# Patient Record
Sex: MALE | Race: WHITE | NOT HISPANIC OR LATINO | Employment: FULL TIME | URBAN - METROPOLITAN AREA
[De-identification: names, ages, dates, MRNs, and addresses within clinical notes are randomized per-mention and may not be internally consistent; named-entity substitution may affect disease eponyms.]

---

## 2023-06-14 PROBLEM — K42.9 UMBILICAL HERNIA: Status: ACTIVE | Noted: 2023-06-14

## 2023-06-14 PROBLEM — M47.812 DEGENERATIVE ARTHRITIS OF CERVICAL SPINE: Status: ACTIVE | Noted: 2023-06-14

## 2023-06-14 PROBLEM — K40.90 INGUINAL HERNIA, RIGHT: Status: ACTIVE | Noted: 2023-06-14

## 2023-06-14 PROBLEM — I10 ESSENTIAL HYPERTENSION: Status: ACTIVE | Noted: 2023-06-14

## 2023-06-14 PROBLEM — F32.A DEPRESSION: Status: ACTIVE | Noted: 2023-06-14

## 2023-06-14 PROBLEM — R73.01 IMPAIRED FASTING GLUCOSE: Status: ACTIVE | Noted: 2023-06-14

## 2023-06-14 PROBLEM — S68.110A AMPUTATION OF RIGHT INDEX FINGER: Status: ACTIVE | Noted: 2023-06-14

## 2023-06-14 PROBLEM — M77.10 LATERAL EPICONDYLITIS: Status: ACTIVE | Noted: 2023-06-14

## 2023-06-14 PROBLEM — K13.0 LIP ULCER: Status: ACTIVE | Noted: 2023-06-14

## 2023-06-14 PROBLEM — M54.12 CERVICAL RADICULITIS: Status: ACTIVE | Noted: 2023-06-14

## 2023-06-14 PROBLEM — M72.0 DUPUYTREN'S DISEASE OF PALM: Status: ACTIVE | Noted: 2023-06-14

## 2023-06-14 PROBLEM — E78.5 DYSLIPIDEMIA: Status: ACTIVE | Noted: 2023-06-14

## 2023-06-14 PROBLEM — G62.9 NEUROPATHY: Status: ACTIVE | Noted: 2023-06-14

## 2023-06-14 PROBLEM — E55.9 VITAMIN D DEFICIENCY: Status: ACTIVE | Noted: 2023-06-14

## 2023-06-14 RX ORDER — BUPROPION HYDROCHLORIDE 150 MG/1
1 TABLET ORAL DAILY
COMMUNITY
Start: 2019-04-30 | End: 2023-10-06

## 2023-06-14 RX ORDER — CHOLECALCIFEROL (VITAMIN D3) 125 MCG
1 CAPSULE ORAL DAILY
COMMUNITY
Start: 2022-08-30 | End: 2023-12-19 | Stop reason: WASHOUT

## 2023-06-14 RX ORDER — LISINOPRIL 10 MG/1
1 TABLET ORAL DAILY
COMMUNITY
Start: 2014-08-26 | End: 2023-10-06

## 2023-06-15 ENCOUNTER — OFFICE VISIT (OUTPATIENT)
Dept: PRIMARY CARE | Facility: CLINIC | Age: 58
End: 2023-06-15
Payer: COMMERCIAL

## 2023-06-15 ENCOUNTER — APPOINTMENT (OUTPATIENT)
Dept: PRIMARY CARE | Facility: CLINIC | Age: 58
End: 2023-06-15
Payer: COMMERCIAL

## 2023-06-15 VITALS
BODY MASS INDEX: 26.19 KG/M2 | DIASTOLIC BLOOD PRESSURE: 76 MMHG | TEMPERATURE: 97.2 F | SYSTOLIC BLOOD PRESSURE: 124 MMHG | HEIGHT: 73 IN | WEIGHT: 197.6 LBS | HEART RATE: 60 BPM

## 2023-06-15 DIAGNOSIS — M54.10 ACUTE LOW BACK PAIN WITH RADICULAR SYMPTOMS, DURATION LESS THAN 6 WEEKS: Primary | ICD-10-CM

## 2023-06-15 PROCEDURE — 3074F SYST BP LT 130 MM HG: CPT | Performed by: NURSE PRACTITIONER

## 2023-06-15 PROCEDURE — 3078F DIAST BP <80 MM HG: CPT | Performed by: NURSE PRACTITIONER

## 2023-06-15 PROCEDURE — 99214 OFFICE O/P EST MOD 30 MIN: CPT | Performed by: NURSE PRACTITIONER

## 2023-06-15 RX ORDER — CYCLOBENZAPRINE HCL 10 MG
10 TABLET ORAL 2 TIMES DAILY PRN
Qty: 30 TABLET | Refills: 0 | Status: SHIPPED | OUTPATIENT
Start: 2023-06-15 | End: 2023-12-26 | Stop reason: WASHOUT

## 2023-06-15 RX ORDER — METHYLPREDNISOLONE 4 MG/1
TABLET ORAL
Qty: 21 TABLET | Refills: 0 | Status: SHIPPED | OUTPATIENT
Start: 2023-06-15 | End: 2023-06-22

## 2023-06-15 NOTE — PATIENT INSTRUCTIONS
Good to see you today  Xray of back and I will follow up   Please set up Red Crow[facility] for additional communication  Medrol Dose Pack as Instructed TAKE WITH FOOD  STOP IBUPROFEN WHEN YOU START THIS  Flexeril (Cyclobenzaprine) 1/2-1 up to twice daily DO NOT DRIVE FOR 6 hours  Try heat/ice and stretch  Follow up will depend on x-ray result

## 2023-06-15 NOTE — PROGRESS NOTES
"Subjective   Patient ID: Joaquim Batres is a 57 y.o. male who presents for Back Pain (Started about 3 weeks ago).    HPI   Drives a lot in a truck.    About 3 weeks ago started to have some numbness and tightness  and pain into back of legs and both legs equally.  Denies any bowel or bladder changes.  Sleeping OK   Golf and yard work.  No hx of trauma to back  Steps  - a little unsteady going down steps  \"Bending over is bad\"  800mg Ibuprofen in AM and then again later in the day.    Hx degenerative change in neck but has not had imaging of low back.    Review of Systems   All other systems reviewed and are negative.      Objective   BP 98/62   Pulse 60   Temp 36.2 °C (97.2 °F)   Ht 1.854 m (6' 1\")   Wt 89.6 kg (197 lb 9.6 oz)   BMI 26.07 kg/m²     Physical Exam  Vitals and nursing note reviewed.   Constitutional:       Appearance: Normal appearance.   HENT:      Head: Normocephalic and atraumatic.   Cardiovascular:      Rate and Rhythm: Normal rate and regular rhythm.      Pulses: Normal pulses.      Heart sounds: Normal heart sounds.   Pulmonary:      Effort: Pulmonary effort is normal.      Breath sounds: Normal breath sounds.   Musculoskeletal:         General: Tenderness present.      Cervical back: Normal range of motion and neck supple.      Comments: Antalgic gait noted.   Pain at lumbar paraspinal Muscles.    Poor flexibility  Reflexes 2+ TASHA  at [place]  NEG straight leg raise TASHA     Skin:     General: Skin is warm.      Findings: No lesion.   Neurological:      Mental Status: He is alert and oriented to person, place, and time. Mental status is at baseline.      Sensory: Sensory deficit present.   Psychiatric:         Mood and Affect: Mood normal.         Behavior: Behavior normal.         Thought Content: Thought content normal.         Judgment: Judgment normal.             Assessment/Plan   Problem List Items Addressed This Visit    None  Visit Diagnoses       Acute low back pain with " radicular symptoms, duration less than 6 weeks    -  Primary    Relevant Medications    cyclobenzaprine (Flexeril) 10 mg tablet    Other Relevant Orders    XR lumbar spine complete 4+ views (Completed)

## 2023-06-16 NOTE — RESULT ENCOUNTER NOTE
Please let pt know that the x-ray shows some degenerative changes   How is he doing with the medication?

## 2023-06-17 ENCOUNTER — TELEPHONE (OUTPATIENT)
Dept: PRIMARY CARE | Facility: CLINIC | Age: 58
End: 2023-06-17
Payer: COMMERCIAL

## 2023-06-17 NOTE — TELEPHONE ENCOUNTER
Pt is aware of results. He verbalized understanding and had no further questions or concerns at this time.

## 2023-06-17 NOTE — TELEPHONE ENCOUNTER
----- Message from SAUNDRA Garsia sent at 6/16/2023  6:35 PM EDT -----  Please let pt know that the x-ray shows some degenerative changes   How is he doing with the medication?

## 2023-09-15 ENCOUNTER — TELEPHONE (OUTPATIENT)
Dept: PRIMARY CARE | Facility: CLINIC | Age: 58
End: 2023-09-15
Payer: COMMERCIAL

## 2023-09-15 DIAGNOSIS — M54.50 LOW BACK PAIN, UNSPECIFIED BACK PAIN LATERALITY, UNSPECIFIED CHRONICITY, UNSPECIFIED WHETHER SCIATICA PRESENT: Primary | ICD-10-CM

## 2023-09-15 RX ORDER — METHYLPREDNISOLONE 4 MG/1
TABLET ORAL
Qty: 21 TABLET | Refills: 0 | Status: SHIPPED | OUTPATIENT
Start: 2023-09-15 | End: 2023-09-22

## 2023-09-15 NOTE — TELEPHONE ENCOUNTER
Patient called requesting another round of steroids for his back. States he was given a round a few months ago. Please advise    [facility]  LOV: 06/15/2023

## 2023-10-06 DIAGNOSIS — F32.9 MAJOR DEPRESSIVE DISORDER, SINGLE EPISODE, UNSPECIFIED: ICD-10-CM

## 2023-10-06 DIAGNOSIS — I10 PRIMARY HYPERTENSION: Primary | ICD-10-CM

## 2023-10-06 RX ORDER — LISINOPRIL 10 MG/1
10 TABLET ORAL DAILY
Qty: 84 TABLET | Refills: 0 | Status: SHIPPED | OUTPATIENT
Start: 2023-10-06 | End: 2023-12-19 | Stop reason: SDUPTHER

## 2023-10-06 RX ORDER — BUPROPION HYDROCHLORIDE 150 MG/1
150 TABLET ORAL DAILY
Qty: 90 TABLET | Refills: 0 | Status: SHIPPED | OUTPATIENT
Start: 2023-10-06 | End: 2023-12-19 | Stop reason: SDUPTHER

## 2023-12-16 ENCOUNTER — LAB (OUTPATIENT)
Dept: LAB | Facility: LAB | Age: 58
End: 2023-12-16
Payer: COMMERCIAL

## 2023-12-18 DIAGNOSIS — R35.1 NOCTURIA: ICD-10-CM

## 2023-12-18 DIAGNOSIS — I10 PRIMARY HYPERTENSION: Primary | ICD-10-CM

## 2023-12-18 DIAGNOSIS — E78.2 MIXED HYPERLIPIDEMIA: ICD-10-CM

## 2023-12-18 DIAGNOSIS — R73.09 ELEVATED GLUCOSE: ICD-10-CM

## 2023-12-19 ENCOUNTER — OFFICE VISIT (OUTPATIENT)
Dept: PRIMARY CARE | Facility: CLINIC | Age: 58
End: 2023-12-19
Payer: COMMERCIAL

## 2023-12-19 ENCOUNTER — LAB (OUTPATIENT)
Dept: LAB | Facility: LAB | Age: 58
End: 2023-12-19
Payer: COMMERCIAL

## 2023-12-19 VITALS
BODY MASS INDEX: 26.41 KG/M2 | WEIGHT: 195 LBS | SYSTOLIC BLOOD PRESSURE: 120 MMHG | HEIGHT: 72 IN | DIASTOLIC BLOOD PRESSURE: 82 MMHG | TEMPERATURE: 98.2 F

## 2023-12-19 DIAGNOSIS — M54.42 CHRONIC BILATERAL LOW BACK PAIN WITH BILATERAL SCIATICA: ICD-10-CM

## 2023-12-19 DIAGNOSIS — E78.2 MIXED HYPERLIPIDEMIA: ICD-10-CM

## 2023-12-19 DIAGNOSIS — I10 PRIMARY HYPERTENSION: Primary | ICD-10-CM

## 2023-12-19 DIAGNOSIS — I10 PRIMARY HYPERTENSION: ICD-10-CM

## 2023-12-19 DIAGNOSIS — R73.09 ELEVATED GLUCOSE: ICD-10-CM

## 2023-12-19 DIAGNOSIS — G89.29 CHRONIC BILATERAL LOW BACK PAIN WITH BILATERAL SCIATICA: ICD-10-CM

## 2023-12-19 DIAGNOSIS — F32.9 MAJOR DEPRESSIVE DISORDER, SINGLE EPISODE, UNSPECIFIED: ICD-10-CM

## 2023-12-19 DIAGNOSIS — R35.1 NOCTURIA: ICD-10-CM

## 2023-12-19 DIAGNOSIS — M54.41 CHRONIC BILATERAL LOW BACK PAIN WITH BILATERAL SCIATICA: ICD-10-CM

## 2023-12-19 PROBLEM — D48.5 NEOPLASM OF UNCERTAIN BEHAVIOR OF SKIN: Status: ACTIVE | Noted: 2020-10-23

## 2023-12-19 LAB
ALBUMIN SERPL BCP-MCNC: 4.4 G/DL (ref 3.4–5)
ALP SERPL-CCNC: 65 U/L (ref 33–120)
ALT SERPL W P-5'-P-CCNC: 26 U/L (ref 10–52)
ANION GAP SERPL CALC-SCNC: 13 MMOL/L (ref 10–20)
AST SERPL W P-5'-P-CCNC: 17 U/L (ref 9–39)
BILIRUB SERPL-MCNC: 0.8 MG/DL (ref 0–1.2)
BUN SERPL-MCNC: 19 MG/DL (ref 6–23)
CALCIUM SERPL-MCNC: 9 MG/DL (ref 8.6–10.3)
CHLORIDE SERPL-SCNC: 104 MMOL/L (ref 98–107)
CHOLEST SERPL-MCNC: 218 MG/DL (ref 0–199)
CHOLESTEROL/HDL RATIO: 3.6
CO2 SERPL-SCNC: 23 MMOL/L (ref 21–32)
CREAT SERPL-MCNC: 0.99 MG/DL (ref 0.5–1.3)
ERYTHROCYTE [DISTWIDTH] IN BLOOD BY AUTOMATED COUNT: 11.5 % (ref 11.5–14.5)
EST. AVERAGE GLUCOSE BLD GHB EST-MCNC: 114 MG/DL
GFR SERPL CREATININE-BSD FRML MDRD: 89 ML/MIN/1.73M*2
GLUCOSE SERPL-MCNC: 100 MG/DL (ref 74–99)
HBA1C MFR BLD: 5.6 %
HCT VFR BLD AUTO: 43.8 % (ref 41–52)
HDLC SERPL-MCNC: 61.2 MG/DL
HGB BLD-MCNC: 14.6 G/DL (ref 13.5–17.5)
LDLC SERPL CALC-MCNC: 137 MG/DL
MCH RBC QN AUTO: 30.7 PG (ref 26–34)
MCHC RBC AUTO-ENTMCNC: 33.3 G/DL (ref 32–36)
MCV RBC AUTO: 92 FL (ref 80–100)
NON HDL CHOLESTEROL: 157 MG/DL (ref 0–149)
NRBC BLD-RTO: 0 /100 WBCS (ref 0–0)
PLATELET # BLD AUTO: 231 X10*3/UL (ref 150–450)
POTASSIUM SERPL-SCNC: 4.5 MMOL/L (ref 3.5–5.3)
PROT SERPL-MCNC: 6.7 G/DL (ref 6.4–8.2)
PSA SERPL-MCNC: 0.8 NG/ML
RBC # BLD AUTO: 4.75 X10*6/UL (ref 4.5–5.9)
SODIUM SERPL-SCNC: 135 MMOL/L (ref 136–145)
TRIGL SERPL-MCNC: 99 MG/DL (ref 0–149)
TSH SERPL-ACNC: 2.41 MIU/L (ref 0.44–3.98)
VLDL: 20 MG/DL (ref 0–40)
WBC # BLD AUTO: 4.3 X10*3/UL (ref 4.4–11.3)

## 2023-12-19 PROCEDURE — 3079F DIAST BP 80-89 MM HG: CPT | Performed by: NURSE PRACTITIONER

## 2023-12-19 PROCEDURE — 36415 COLL VENOUS BLD VENIPUNCTURE: CPT

## 2023-12-19 PROCEDURE — 80061 LIPID PANEL: CPT

## 2023-12-19 PROCEDURE — 3074F SYST BP LT 130 MM HG: CPT | Performed by: NURSE PRACTITIONER

## 2023-12-19 PROCEDURE — 83036 HEMOGLOBIN GLYCOSYLATED A1C: CPT

## 2023-12-19 PROCEDURE — 84153 ASSAY OF PSA TOTAL: CPT

## 2023-12-19 PROCEDURE — 99214 OFFICE O/P EST MOD 30 MIN: CPT | Performed by: NURSE PRACTITIONER

## 2023-12-19 PROCEDURE — 84443 ASSAY THYROID STIM HORMONE: CPT

## 2023-12-19 PROCEDURE — 85027 COMPLETE CBC AUTOMATED: CPT

## 2023-12-19 PROCEDURE — 80053 COMPREHEN METABOLIC PANEL: CPT

## 2023-12-19 RX ORDER — BUPROPION HYDROCHLORIDE 150 MG/1
150 TABLET ORAL DAILY
Qty: 90 TABLET | Refills: 3 | Status: SHIPPED | OUTPATIENT
Start: 2023-12-19

## 2023-12-19 RX ORDER — LISINOPRIL 10 MG/1
10 TABLET ORAL DAILY
Qty: 90 TABLET | Refills: 3 | Status: SHIPPED | OUTPATIENT
Start: 2023-12-19

## 2023-12-19 ASSESSMENT — PROMIS GLOBAL HEALTH SCALE
RATE_QUALITY_OF_LIFE: GOOD
CARRYOUT_SOCIAL_ACTIVITIES: GOOD
RATE_AVERAGE_PAIN: 5
RATE_PHYSICAL_HEALTH: FAIR
CARRYOUT_PHYSICAL_ACTIVITIES: MOSTLY
RATE_GENERAL_HEALTH: GOOD
EMOTIONAL_PROBLEMS: RARELY
RATE_MENTAL_HEALTH: GOOD
RATE_AVERAGE_FATIGUE: MILD
RATE_SOCIAL_SATISFACTION: GOOD

## 2023-12-19 ASSESSMENT — PATIENT HEALTH QUESTIONNAIRE - PHQ9
1. LITTLE INTEREST OR PLEASURE IN DOING THINGS: NOT AT ALL
2. FEELING DOWN, DEPRESSED OR HOPELESS: NOT AT ALL
SUM OF ALL RESPONSES TO PHQ9 QUESTIONS 1 AND 2: 0

## 2023-12-19 NOTE — PATIENT INSTRUCTIONS
I will follow up with the labs.  REFER to Pain Management -  [place] [phone]   (Dr. Huang)  Prescriptions refilled   Let me know if you need anything.
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
## 2023-12-20 DIAGNOSIS — E78.2 MIXED HYPERLIPIDEMIA: Primary | ICD-10-CM

## 2023-12-26 PROBLEM — F32.9 MAJOR DEPRESSIVE DISORDER, SINGLE EPISODE, UNSPECIFIED: Status: ACTIVE | Noted: 2023-12-26

## 2023-12-26 PROBLEM — M54.42 CHRONIC BILATERAL LOW BACK PAIN WITH BILATERAL SCIATICA: Status: ACTIVE | Noted: 2023-12-26

## 2023-12-26 PROBLEM — M54.41 CHRONIC BILATERAL LOW BACK PAIN WITH BILATERAL SCIATICA: Status: ACTIVE | Noted: 2023-12-26

## 2023-12-26 PROBLEM — G89.29 CHRONIC BILATERAL LOW BACK PAIN WITH BILATERAL SCIATICA: Status: ACTIVE | Noted: 2023-12-26

## 2023-12-26 ASSESSMENT — ENCOUNTER SYMPTOMS: BACK PAIN: 1

## 2024-01-31 ENCOUNTER — OFFICE VISIT (OUTPATIENT)
Dept: PAIN MEDICINE | Facility: HOSPITAL | Age: 59
End: 2024-01-31
Payer: COMMERCIAL

## 2024-01-31 VITALS
BODY MASS INDEX: 27.28 KG/M2 | HEART RATE: 66 BPM | HEIGHT: 72 IN | RESPIRATION RATE: 16 BRPM | DIASTOLIC BLOOD PRESSURE: 95 MMHG | SYSTOLIC BLOOD PRESSURE: 135 MMHG | WEIGHT: 201.4 LBS

## 2024-01-31 DIAGNOSIS — G89.29 CHRONIC BILATERAL LOW BACK PAIN WITH BILATERAL SCIATICA: ICD-10-CM

## 2024-01-31 DIAGNOSIS — M47.816 LUMBAR SPONDYLOSIS: ICD-10-CM

## 2024-01-31 DIAGNOSIS — M54.16 LUMBAR NEURITIS: Primary | ICD-10-CM

## 2024-01-31 DIAGNOSIS — M54.41 CHRONIC BILATERAL LOW BACK PAIN WITH BILATERAL SCIATICA: ICD-10-CM

## 2024-01-31 DIAGNOSIS — M54.42 CHRONIC BILATERAL LOW BACK PAIN WITH BILATERAL SCIATICA: ICD-10-CM

## 2024-01-31 PROCEDURE — 99204 OFFICE O/P NEW MOD 45 MIN: CPT | Performed by: PHYSICAL MEDICINE & REHABILITATION

## 2024-01-31 PROCEDURE — 3075F SYST BP GE 130 - 139MM HG: CPT | Performed by: PHYSICAL MEDICINE & REHABILITATION

## 2024-01-31 PROCEDURE — 99214 OFFICE O/P EST MOD 30 MIN: CPT | Performed by: PHYSICAL MEDICINE & REHABILITATION

## 2024-01-31 PROCEDURE — 3080F DIAST BP >= 90 MM HG: CPT | Performed by: PHYSICAL MEDICINE & REHABILITATION

## 2024-01-31 RX ORDER — ACETAMINOPHEN AND IBUPROFEN 250; 125 MG/1; MG/1
TABLET, FILM COATED ORAL EVERY 8 HOURS
COMMUNITY

## 2024-01-31 RX ORDER — GABAPENTIN 300 MG/1
CAPSULE ORAL
Qty: 180 CAPSULE | Refills: 2 | Status: SHIPPED | OUTPATIENT
Start: 2024-01-31 | End: 2024-04-22 | Stop reason: SDUPTHER

## 2024-01-31 ASSESSMENT — COLUMBIA-SUICIDE SEVERITY RATING SCALE - C-SSRS
6. HAVE YOU EVER DONE ANYTHING, STARTED TO DO ANYTHING, OR PREPARED TO DO ANYTHING TO END YOUR LIFE?: NO
2. HAVE YOU ACTUALLY HAD ANY THOUGHTS OF KILLING YOURSELF?: NO
1. IN THE PAST MONTH, HAVE YOU WISHED YOU WERE DEAD OR WISHED YOU COULD GO TO SLEEP AND NOT WAKE UP?: NO

## 2024-01-31 ASSESSMENT — PATIENT HEALTH QUESTIONNAIRE - PHQ9
SUM OF ALL RESPONSES TO PHQ9 QUESTIONS 1 AND 2: 0
2. FEELING DOWN, DEPRESSED OR HOPELESS: NOT AT ALL
1. LITTLE INTEREST OR PLEASURE IN DOING THINGS: NOT AT ALL

## 2024-01-31 ASSESSMENT — ENCOUNTER SYMPTOMS
DEPRESSION: 0
LOSS OF SENSATION IN FEET: 0
OCCASIONAL FEELINGS OF UNSTEADINESS: 0
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
## 2024-02-13 ENCOUNTER — EVALUATION (OUTPATIENT)
Dept: PHYSICAL THERAPY | Facility: CLINIC | Age: 59
End: 2024-02-13
Payer: COMMERCIAL

## 2024-02-13 DIAGNOSIS — G89.29 CHRONIC BILATERAL LOW BACK PAIN WITH BILATERAL SCIATICA: Primary | ICD-10-CM

## 2024-02-13 DIAGNOSIS — M54.41 CHRONIC BILATERAL LOW BACK PAIN WITH BILATERAL SCIATICA: Primary | ICD-10-CM

## 2024-02-13 DIAGNOSIS — M47.816 LUMBAR SPONDYLOSIS: ICD-10-CM

## 2024-02-13 DIAGNOSIS — M54.42 CHRONIC BILATERAL LOW BACK PAIN WITH BILATERAL SCIATICA: Primary | ICD-10-CM

## 2024-02-13 DIAGNOSIS — M54.16 LUMBAR NEURITIS: ICD-10-CM

## 2024-02-13 PROCEDURE — 97161 PT EVAL LOW COMPLEX 20 MIN: CPT | Mod: GP

## 2024-02-13 PROCEDURE — 97110 THERAPEUTIC EXERCISES: CPT | Mod: GP

## 2024-02-13 ASSESSMENT — PAIN - FUNCTIONAL ASSESSMENT: PAIN_FUNCTIONAL_ASSESSMENT: 0-10

## 2024-02-13 ASSESSMENT — ENCOUNTER SYMPTOMS
DEPRESSION: 0
LOSS OF SENSATION IN FEET: 1
OCCASIONAL FEELINGS OF UNSTEADINESS: 1

## 2024-02-13 ASSESSMENT — PAIN SCALES - GENERAL: PAINLEVEL_OUTOF10: 2

## 2024-02-13 NOTE — PROGRESS NOTES
Physical Therapy    Physical Therapy Evaluation and Treatment      Patient Name: Joaquim Batres  MRN: [id number]  Today's Date: 2/13/2024  Time Calculation  Start Time: 1745  Stop Time: 1825  Time Calculation (min): 40 min    Assessment:  PT Assessment  PT Assessment Results: Decreased mobility, Pain  Rehab Prognosis: Good The patient is a 58 year old male presenting to PT with LBP, TASHA HS muscle tightness, and painful trunk extension ROM.  The patient demonstrates trunk flexion direction of preference with repetitive trunk movements.  The patient will benefit from skilled intervention to address above deficits & return to previous activity level.      Plan:  OP PT Plan  Treatment/Interventions: Cryotherapy, Hot pack, Therapeutic exercises, Dry needling, Education/ Instruction, Electrical stimulation, Manual therapy, Mechanical traction  PT Plan: Skilled PT  PT Frequency: 1 time per week  Rehab Potential: Good  Plan of Care Agreement: Patient    Current Problem:   1. Chronic bilateral low back pain with bilateral sciatica  Follow Up In Physical Therapy      2. Lumbar neuritis  Referral to Physical Therapy    CANCELED: Follow Up In Physical Therapy      3. Lumbar spondylosis  Referral to Physical Therapy        Subjective    General:  General  Reason for Referral: lumbar neuritis; lumbar spondylosis  Referred By: Sal SUE  Past Medical History Relevant to Rehab: reviewed  The patient reports he has been experiencing LBP with intermittent TASHA LE radicular symptoms for the past 8 months.  No GE.  Pain ranges from 4-8/10.  X-rays 6/15/23 revealed mild degenerative endplate hypertrophy.  Follow with MD in a few months.  The patient's goal is to decrease LBP/LE radicular symptoms with all activities.      Precautions:  Precautions  STEADI Fall Risk Score (The score of 4 or more indicates an increased risk of falling): 5  Precautions Comment: none     Pain:  Pain Assessment  Pain Assessment: 0-10  Pain Score: 2  Pain  "Location: Back  Pain Orientation: Lower     Prior Level of Function:  Prior Function Per Pt/Caregiver Report  Level of [place]: Independent with ADLs and functional transfers    Objective   Trunk AROM (degrees)  Flexion - 80  Extension - 11    Repetitive trunk movements  X 10 Flexion - no change in pain  X 10 Extension - increased LBP     Trunk strength - 5/5    HS flexibility (degrees)  R - 48  L - 45    Outcome Measures:  Other Measures  Oswestry Disablity Index (ENA): 27     Treatments:  Supine piriformis stretch - x3 ea 30\"H  Supine LTRs - x10 ea 5\"H  Seated HS stretch - x3 ea 30\"H    EDUCATION:   HEP    Goals: (by week 8)  Decrease LBP/LE radicular symptoms with all activities <2/10    Increase TASHA HS flexibility to >60 degrees for improving mobility with daily activities    The patient will demonstrate pain free trunk AROM               "

## 2024-02-20 ENCOUNTER — TREATMENT (OUTPATIENT)
Dept: PHYSICAL THERAPY | Facility: CLINIC | Age: 59
End: 2024-02-20
Payer: COMMERCIAL

## 2024-02-20 DIAGNOSIS — M54.42 CHRONIC BILATERAL LOW BACK PAIN WITH BILATERAL SCIATICA: ICD-10-CM

## 2024-02-20 DIAGNOSIS — G89.29 CHRONIC BILATERAL LOW BACK PAIN WITH BILATERAL SCIATICA: ICD-10-CM

## 2024-02-20 DIAGNOSIS — M54.41 CHRONIC BILATERAL LOW BACK PAIN WITH BILATERAL SCIATICA: ICD-10-CM

## 2024-02-20 PROCEDURE — 97110 THERAPEUTIC EXERCISES: CPT | Mod: GP

## 2024-02-20 ASSESSMENT — PAIN - FUNCTIONAL ASSESSMENT: PAIN_FUNCTIONAL_ASSESSMENT: 0-10

## 2024-02-20 ASSESSMENT — PAIN SCALES - GENERAL: PAINLEVEL_OUTOF10: 3

## 2024-02-20 NOTE — PROGRESS NOTES
"Physical Therapy    Physical Therapy Treatment      Patient Name: Joaquim Batres  MRN: [id number]  Today's Date: 2/20/2024  Time Calculation  Start Time: 1700  Stop Time: 1745  Time Calculation (min): 45 min    Assessment:    The patient reports experiencing increased low back discomfort with L to R horizontal oblique pulls.      Plan:   Treatment/Interventions: Cryotherapy, Hot pack, Therapeutic exercises, Dry needling, Education/ Instruction, Electrical stimulation, Manual therapy, Mechanical traction     Current Problem:   1. Chronic bilateral low back pain with bilateral sciatica  Follow Up In Physical Therapy        Subjective    General:     The patient reports he has been compliant with HEP 2x/day.      Precautions:  Precautions  Precautions Comment: none     Pain:  Pain Assessment  Pain Assessment: 0-10  Pain Score: 3  Pain Location: Back  Pain Orientation: Lower        Objective   HS flexibility (degrees)  R - 48  L - 45      Treatments:  EXERCISES Date 2/20/24 Date Date Date    REPS REPS REPS REPS   Back Extension 70# 3x20             Ab Curls 70# 3x15             4 Way Hip Flexion/Abd 60#/50# 3x10             Bank Walks              Tband   Lat Pulls Black x20      Tband   Skis Black x20      Tband   Mid Row Black x20      Tband   Mid traps Black x20             Band Walks       Oblique Pulls <> 20# x10 ea 5\"H       High to low cross body 20# x10 R>L 5\"H; 15# x10 5\"H L>R      Low to high cross body  20# x10 R>L 5\"H; 15# x10 5\"H L>R                                                                                            Goals: (by week 8)  Decrease LBP/LE radicular symptoms with all activities <2/10 -progressing    Increase TASHA HS flexibility to >60 degrees for improving mobility with daily activities    The patient will demonstrate pain free trunk AROM             "

## 2024-02-27 ENCOUNTER — TREATMENT (OUTPATIENT)
Dept: PHYSICAL THERAPY | Facility: CLINIC | Age: 59
End: 2024-02-27
Payer: COMMERCIAL

## 2024-02-27 DIAGNOSIS — M54.41 CHRONIC BILATERAL LOW BACK PAIN WITH BILATERAL SCIATICA: Primary | ICD-10-CM

## 2024-02-27 DIAGNOSIS — M47.816 LUMBAR SPONDYLOSIS: ICD-10-CM

## 2024-02-27 DIAGNOSIS — M54.16 LUMBAR NEURITIS: ICD-10-CM

## 2024-02-27 DIAGNOSIS — M54.42 CHRONIC BILATERAL LOW BACK PAIN WITH BILATERAL SCIATICA: Primary | ICD-10-CM

## 2024-02-27 DIAGNOSIS — G89.29 CHRONIC BILATERAL LOW BACK PAIN WITH BILATERAL SCIATICA: Primary | ICD-10-CM

## 2024-02-27 PROCEDURE — 97110 THERAPEUTIC EXERCISES: CPT | Mod: GP

## 2024-02-27 ASSESSMENT — PAIN - FUNCTIONAL ASSESSMENT: PAIN_FUNCTIONAL_ASSESSMENT: 0-10

## 2024-02-27 ASSESSMENT — PAIN SCALES - GENERAL: PAINLEVEL_OUTOF10: 3

## 2024-02-27 NOTE — PROGRESS NOTES
"Physical Therapy    Physical Therapy Treatment      Patient Name: Joaquim Batres  MRN: [id number]  Today's Date:    Start Time: 1745  Stop Time: 1830  Time Calculation (min): 45 min  Visit #3    Assessment  The patient reports experiencing 3-4/10 low back discomfort throughout treatment.      Plan:   Treatment/Interventions: Cryotherapy, Hot pack, Therapeutic exercises, Dry needling, Education/ Instruction, Electrical stimulation, Manual therapy, Mechanical traction     Current Problem:   1. Chronic bilateral low back pain with bilateral sciatica  Follow Up In Physical Therapy      2. Lumbar neuritis        3. Lumbar spondylosis          Subjective    General:   The patient reports he continues to experience intermittent LE radicular symptoms with daily & work activities.      Precautions:  Precautions  Precautions Comment: none     Pain:  Pain Assessment  Pain Assessment: 0-10  Pain Score: 3  Pain Location: Back  Pain Orientation: Lower        Objective   HS flexibility (degrees)  R - 48      Treatments:  EXERCISES Date 2/20/24 Date 2/27/24 Date Date    REPS REPS REPS REPS   Back Extension 70# 3x20 70# 3x20            Ab Curls 70# 3x15 70# 3x15            4 Way Hip Flexion/Abd 60#/50# 3x10 50# 3x10            Bank Walks              Tband   Lat Pulls Black x20 Black x20     Tband   Skis Black x20 Black x20     Tband   Mid Row Black x20 Black x20     Tband   Mid traps Black x20 Black x20            Band Walks       Oblique Pulls <> 20# x10 ea 5\"H  15# x10 5\"H ea     High to low cross body 20# x10 R>L 5\"H; 15# x10 5\"H L>R 15# x10 5\"H ea     Low to high cross body  20# x10 R>L 5\"H; 15# x10 5\"H L>R 15# x10 5\"H ea            Prone physioball alt A/L  X20 ea            Long sit ext with TB  Black x10 10\"H                                                               Goals: (by week 8)  Decrease LBP/LE radicular symptoms with all activities <2/10 -progressing    Increase TASHA HS flexibility to >60 degrees for improving " mobility with daily activities    The patient will demonstrate pain free trunk AROM

## 2024-03-05 ENCOUNTER — TREATMENT (OUTPATIENT)
Dept: PHYSICAL THERAPY | Facility: CLINIC | Age: 59
End: 2024-03-05
Payer: COMMERCIAL

## 2024-03-05 DIAGNOSIS — M54.16 LUMBAR NEURITIS: ICD-10-CM

## 2024-03-05 DIAGNOSIS — M54.41 CHRONIC BILATERAL LOW BACK PAIN WITH BILATERAL SCIATICA: Primary | ICD-10-CM

## 2024-03-05 DIAGNOSIS — M47.816 LUMBAR SPONDYLOSIS: ICD-10-CM

## 2024-03-05 DIAGNOSIS — G89.29 CHRONIC BILATERAL LOW BACK PAIN WITH BILATERAL SCIATICA: Primary | ICD-10-CM

## 2024-03-05 DIAGNOSIS — M54.42 CHRONIC BILATERAL LOW BACK PAIN WITH BILATERAL SCIATICA: Primary | ICD-10-CM

## 2024-03-05 PROCEDURE — 97110 THERAPEUTIC EXERCISES: CPT | Mod: GP

## 2024-03-05 ASSESSMENT — PAIN - FUNCTIONAL ASSESSMENT: PAIN_FUNCTIONAL_ASSESSMENT: 0-10

## 2024-03-05 ASSESSMENT — PAIN SCALES - GENERAL: PAINLEVEL_OUTOF10: 3

## 2024-03-05 NOTE — PROGRESS NOTES
"Physical Therapy    Physical Therapy Treatment      Patient Name: Joaquim Batres  MRN: [id number]  Today's Date: 03/05/24   Start Time: 1700  Stop Time: 71179  Time Calculation (min): 45 min  Visit #4    Assessment    The patient reports hip abduction exercise exacerbated pain.  I advised the patient to halt any exercise if LE radicular symptoms or LBP become exacerbated.      Plan:   Treatment/Interventions: Cryotherapy, Hot pack, Therapeutic exercises, Dry needling, Education/ Instruction, Electrical stimulation, Manual therapy, Mechanical traction     Reassess next visit    Current Problem:   1. Chronic bilateral low back pain with bilateral sciatica  Follow Up In Physical Therapy      2. Lumbar neuritis        3. Lumbar spondylosis          Subjective    General:   The patient reports he has been experiencing L LE radicular symptoms for ~1 week.    Precautions:  Precautions  Precautions Comment: none     Pain:  Pain Assessment  Pain Assessment: 0-10  Pain Score: 3  Pain Location: Back  Pain Orientation: Lower        Objective   Trunk strength = 5/5      Treatments:  EXERCISES Date 2/20/24 Date 2/27/24 Date 3/5/24 Date    REPS REPS REPS REPS   Back Extension 70# 3x20 70# 3x20 70# 3x20           Ab Curls 70# 3x15 70# 3x15 70# 3x15           4 Way Hip Flexion/Abd 60#/50# 3x10 50# 3x10 50# 3x10           Bank Walks              Tband   Lat Pulls Black x20 Black x20 Black x20    Tband   Skis Black x20 Black x20 Black x20    Tband   Mid Row Black x20 Black x20 Black x20    Tband   Mid traps Black x20 Black x20 Black x20           Band Walks       Oblique Pulls <> 20# x10 ea 5\"H  15# x10 5\"H ea 15# x10 5\"H ea    High to low cross body 20# x10 R>L 5\"H; 15# x10 5\"H L>R 15# x10 5\"H ea 15# x10 5\"H ea    Low to high cross body  20# x10 R>L 5\"H; 15# x10 5\"H L>R 15# x10 5\"H ea 15# x10 5\"H ea           Prone physioball alt A/L  X20 ea X10 ea           Long sit ext with TB  Black x10 10\"H Black x10 10\"H                       "                                        Goals: (by week 8)  Decrease LBP/LE radicular symptoms with all activities <2/10 -progressing    Increase TASHA HS flexibility to >60 degrees for improving mobility with daily activities    The patient will demonstrate pain free trunk AROM

## 2024-03-15 ENCOUNTER — APPOINTMENT (OUTPATIENT)
Dept: PHYSICAL THERAPY | Facility: CLINIC | Age: 59
End: 2024-03-15
Payer: COMMERCIAL

## 2024-03-15 ENCOUNTER — DOCUMENTATION (OUTPATIENT)
Dept: PHYSICAL THERAPY | Facility: CLINIC | Age: 59
End: 2024-03-15
Payer: COMMERCIAL

## 2024-03-15 NOTE — PROGRESS NOTES
Physical Therapy                 Therapy Communication Note    Patient Name: Joaquim Batres  MRN: [id number]  Today's Date: 3/15/2024     Discipline: Physical Therapy    Missed Time: No Show

## 2024-03-28 ENCOUNTER — OFFICE VISIT (OUTPATIENT)
Dept: PAIN MEDICINE | Facility: HOSPITAL | Age: 59
End: 2024-03-28
Payer: COMMERCIAL

## 2024-03-28 VITALS
HEART RATE: 61 BPM | HEIGHT: 72 IN | DIASTOLIC BLOOD PRESSURE: 76 MMHG | BODY MASS INDEX: 27.09 KG/M2 | OXYGEN SATURATION: 97 % | WEIGHT: 200 LBS | SYSTOLIC BLOOD PRESSURE: 120 MMHG | RESPIRATION RATE: 16 BRPM

## 2024-03-28 DIAGNOSIS — M54.16 LUMBAR RADICULOPATHY: Primary | ICD-10-CM

## 2024-03-28 DIAGNOSIS — M54.16 LUMBAR NEURITIS: ICD-10-CM

## 2024-03-28 PROCEDURE — 99214 OFFICE O/P EST MOD 30 MIN: CPT | Performed by: CLINICAL NURSE SPECIALIST

## 2024-03-28 PROCEDURE — 3078F DIAST BP <80 MM HG: CPT | Performed by: CLINICAL NURSE SPECIALIST

## 2024-03-28 PROCEDURE — 3074F SYST BP LT 130 MM HG: CPT | Performed by: CLINICAL NURSE SPECIALIST

## 2024-03-28 RX ORDER — DULOXETIN HYDROCHLORIDE 30 MG/1
30 CAPSULE, DELAYED RELEASE ORAL DAILY
Qty: 7 CAPSULE | Refills: 0 | Status: SHIPPED | OUTPATIENT
Start: 2024-03-28 | End: 2024-04-22 | Stop reason: ALTCHOICE

## 2024-03-28 RX ORDER — DULOXETIN HYDROCHLORIDE 60 MG/1
60 CAPSULE, DELAYED RELEASE ORAL DAILY
Qty: 30 CAPSULE | Refills: 2 | Status: SHIPPED | OUTPATIENT
Start: 2024-04-04 | End: 2024-05-04

## 2024-03-28 ASSESSMENT — PATIENT HEALTH QUESTIONNAIRE - PHQ9
2. FEELING DOWN, DEPRESSED OR HOPELESS: NOT AT ALL
SUM OF ALL RESPONSES TO PHQ9 QUESTIONS 1 AND 2: 0
1. LITTLE INTEREST OR PLEASURE IN DOING THINGS: NOT AT ALL

## 2024-03-28 ASSESSMENT — ENCOUNTER SYMPTOMS
DEPRESSION: 0
OCCASIONAL FEELINGS OF UNSTEADINESS: 0
LOSS OF SENSATION IN FEET: 0

## 2024-03-28 ASSESSMENT — COLUMBIA-SUICIDE SEVERITY RATING SCALE - C-SSRS
6. HAVE YOU EVER DONE ANYTHING, STARTED TO DO ANYTHING, OR PREPARED TO DO ANYTHING TO END YOUR LIFE?: NO
1. IN THE PAST MONTH, HAVE YOU WISHED YOU WERE DEAD OR WISHED YOU COULD GO TO SLEEP AND NOT WAKE UP?: NO
2. HAVE YOU ACTUALLY HAD ANY THOUGHTS OF KILLING YOURSELF?: NO

## 2024-03-28 NOTE — ASSESSMENT & PLAN NOTE
58-year-old male presents for follow-up of lower back pain radiating to bilateral lower extremities.  Recent lumbar x-ray consistent with degenerative changes.  Patient was sent for a formal course of physical therapy which he was unable to tolerate secondary to increasing pain.  He actually feels that physical therapy caused worsening radicular symptoms.  Symptoms and exam at today's visit consistent with a lumbar neuritis.  Initially started on gabapentin 600 mg 3 times daily which patient increased to 4 times daily secondary to increasing pain.  Unfortunately patient developed mental fogginess/cognitive changes with higher dose of gabapentin.  Advised patient to decrease his gabapentin dose to 600 mg 3 times daily and observe for improvement in cognitive changes.  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  Added duloxetine titration starting with 30 mg daily for 1 week and increasing to 60 mg if tolerated.  Explained to patient that duloxetine may help with the neuropathic component of pain as well as musculoskeletal component.  Continue to supplement with dual action Advil.  Recommended he continue to limit use of both Advil and Tylenol.  Considering patient has worsening symptoms of lumbar radiculopathy and failed conservative treatment including physical therapy and medications; we will send for MRI lumbar spine.  Follow-up after MRI for further evaluation.  Based on results of MRI patient may benefit from lumbar epidural steroid injection targeting lumbar neuritis.  Plan reviewed with patient and his wife at today's visit.    -Sending patient for lumbar MRI for evaluation of symptoms consistent with lumbar neuritis and failure of conservative therapy.  -Advised patient to continue gabapentin 600 mg 3 times daily and observe for improvement in mental fogginess/cognitive changes with this dose.  The patient was counseled on the risks and potential side effects  of gabapentin as well as its importance for dosage titration. Side effects included but were not limited to, drowsiness, sedation, cognitive decline, peripheral edema, weight gain, seizures, with abrupt withdrawal.  Patient was instructed to call the office with any concerns or side effects before abruptly stopping medication.   -Patient may continue dual action Advil.  Currently taking 2 tablets 4 times per day.  Recommended he continue to limit his dose of both Tylenol and Advil within recommended doses.    -Added duloxetine titration starting with 30 mg for 7 days and if tolerated increasing to 60 mg daily.  Reviewed potential side effects with patient.  -Advised patient to hold off on further physical therapy until pain is better controlled and he has completed his lumbar MRI.  -Patient will follow-up after completing lumbar MRI.  Further recommendations may include epidural steroid injections after review of imaging.

## 2024-03-28 NOTE — PROGRESS NOTES
Subjective   Patient ID: Joaquim Batres is a 58 y.o. male who presents for lumbar radicular pain  HPI  -year-old male who presents for follow-up of lumbar radicular pain.  Medical history consistent with hypertension, depression.  Patient states low back pain started approximately 10 months ago without any inciting event.  Lumbar x-ray consistent with degenerative changes particularly at L4-5.  No previous MRI.  Patient was taking a large amount of both Advil and Tylenol prior to his initial visit.  He also had tried a steroid taper initially that provided some relief.  Added gabapentin 600 mg 3 times daily and sent the patient to physical therapy at his last visit.  He presents today for follow-up.  Pain across his low back radiating into bilateral lower extremities left greater than right.  Pain radiates posteriorly to his feet.  He endorses numbness and tingling to lower back as well as bilateral lower extremities.  He denies any increasing weakness, although he states that when the pain is sharp and shooting he feels his legs might give out secondary to pain.  He denies any changes in bowel/bladder function.  Pain is described as sharp and electrical.  Pain is debilitating and makes it difficult for him to stand, walk, sit.  He states that sleep is difficult secondary to pain.  He states that he increased his gabapentin to 600 mg 4 times daily because he felt his pain was more intense.  He did note an increase in mental fogginess and cognitive changes with the fourth dose of gabapentin.  Patient is a  and requires mental clarity which has been affected by his increase in gabapentin dose.  He continues taking dual action Advil/Tylenol 2 tablets 4 times daily.  He completed approximately 4-5 sessions of physical therapy and states that it increased his pain.  He feels that after physical therapy he has noted an increase in radicular symptoms.      Pt is here for low back pain that  "intermittently radiates into bilateral posterior legs into feet. Describes as sharp, electrical pain. The radicular pain has worsened.    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 I have considered the risks of abuse, dependence, addiction and diversion    Is the patient prescribed a combination of a benzodiazepine and opioid?  No    Last Urine Drug Screen / ordered today: No  No results found for this or any previous visit (from the past 8760 hour(s)).  N/A    Controlled Substance Agreement:  Date of the Last Agreement:       Monitoring and compliance:    ORT:    PDUQ:    Office Agreement:      Review of Systems    ROS:   General: No fevers, chills, weight loss  Skin: Negative for lesions  Eyes: No acute vision changes  Ears: No vertigo  Nose, mouth, throat: No difficulty swallowing or speaking  Respiratory: No cough, shortness of breath, cyanosis  Cardiovascular: Negative for chest pain syncope or palpitation  Gastrointestinal: No constipation, nausea, vomiting  Neurological: Negative for headache, positive for: Paresthesia and intermittent weakness lower extremities  Psychological: Negative for severe or debilitating anxiety, depression. Negative memory loss  Musculoskeletal: Positive for arthralgia, myalgia, pain  Endocrine: Negative for weight gain, appetite changes, excessive sweating  Allergy/immune: Negative    All 13 systems were reviewed and are within normal levels except as noted or in the history of present illness.  Positive or pertinent negative responses are noted or were in the history of present illness. As noted, " the patient denies significant or impairing weakness in the bilateral upper and lower extremities, medication induced constipation, and bowel or bladder incontinence.     Current Outpatient Medications:     buPROPion XL (Wellbutrin XL) 150 mg 24 hr tablet, Take 1 tablet (150 mg) by mouth once daily., Disp: 90 tablet, Rfl: 3    gabapentin (Neurontin) 300 mg capsule, Day 1 Take 300mg at bedtime, Day 2 300mg BID, Day 3 300mg TID, Day 4-6 300mg AM 300mg afternoon 600mg at bedtime, Day 7-9 300mg Am, 600mg afternoon, 600mg at bedtime, Day 10 and after 600mg TID., Disp: 180 capsule, Rfl: 2    ibuprofen-acetaminophen (AdviL Dual Action) 125-250 mg tablet per tablet, Take by mouth every 8 hours., Disp: , Rfl:     lisinopril 10 mg tablet, Take 1 tablet (10 mg) by mouth once daily. for blood pressure, Disp: 90 tablet, Rfl: 3     Past Medical History:   Diagnosis Date    Other forms of angina pectoris (CMS/[facility]) 09/03/2014    Angina at rest    Unspecified injury of right wrist, hand and finger(s), sequela 02/17/2018    Injury of right index finger, sequela        No past surgical history on file.     Family History   Problem Relation Name Age of Onset    No Known Problems Mother      No Known Problems Father          No Known Allergies     Objective     Visit Vitals  Smoking Status Former        Physical Exam    PE:  General: Well-developed, well-nourished, no acute distress. The patient demonstrates no pain behavior, symptom magnification or overt drug-seeking behavior.  Eye: Pupils appropriate for room lighting  Neck/thyroid: No obvious goiter or enlargement of neck noted  Respiratory exam: Normal respiratory effort, unlabored respiration. No accessory muscle use noted  Cardiac exam: Bilateral radial pulses intact  Abdominal: Nondistended  Spine, lumbar: The patient is able to rise from a seated to standing position with hesitancy secondary to pain.  Gait is slow and forward leaning.  Tenderness to paraspinous musculature is  noted lower lumbar spine.  Flexion and extension both limited secondary to pain with extension increasing pain to lower back and lower extremities.  Positive straight leg raise bilaterally.  Neurologic exam: Muscle strength is antigravity in all 4 extremities.  Equal strength bilateral lower extremities 5/5.  Psychiatric exam: Judgment and insight normal, affect normal, speech is fluent, affect appropriate, demonstrating no signs of hypersomnolence, sedation, or confusion        Assessment/Plan   Problem List Items Addressed This Visit             ICD-10-CM    Lumbar neuritis M54.16     58-year-old male presents for follow-up of lower back pain radiating to bilateral lower extremities.  Recent lumbar x-ray consistent with degenerative changes.  Patient was sent for a formal course of physical therapy which she was unable to tolerate secondary to increasing pain.  He actually feels that physical therapy caused worsening radicular symptoms.  Patient has symptoms and exam consistent with a lumbar neuritis.  Initially started on gabapentin 600 mg 3 times daily which patient increased to 4 times daily secondary to increasing pain.  Unfortunately patient developed mental fogginess/cognitive changes with higher dose of gabapentin.  Advised patient to decrease his gabapentin dose to 600 mg 3 times daily and observe for improvement in cognitive changes.  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  Added duloxetine titration starting with 30 mg daily for 1 week and increasing to 60 mg if tolerated.  Explained to patient that duloxetine may help with the neuropathic component of pain as well as musculoskeletal component.  Continue to supplement with dual action Advil.  Recommended he continue to limit use of both Advil and Tylenol.  Considering patient has worsening symptoms of lumbar radiculopathy and failed conservative treatment including physical therapy and medications; we  will send for MRI lumbar spine.  Follow-up after MRI for further evaluation.  Based on results of MRI patient may benefit from lumbar epidural steroid injection targeting lumbar neuritis.  Plan reviewed with patient and his wife at today's visit.    -Sending patient for lumbar MRI for evaluation of symptoms consistent with lumbar neuritis and failure of conservative therapy.  -Advised patient to continue gabapentin 600 mg 3 times daily and observe for improvement in mental fogginess/cognitive changes with this dose.  The patient was counseled on the risks and potential side effects of gabapentin as well as its importance for dosage titration. Side effects included but were not limited to, drowsiness, sedation, cognitive decline, peripheral edema, weight gain, seizures, with abrupt withdrawal.  Patient was instructed to call the office with any concerns or side effects before abruptly stopping medication.   -Patient may continue dual action Advil.  Currently taking 2 tablets 4 times per day.  Recommended he continue to limit his dose of both Tylenol and Advil within recommended doses.    -Added duloxetine titration starting with 30 mg for 7 days and if tolerated increasing to 60 mg daily.  Reviewed potential side effects with patient.  -Advised patient to hold off on further physical therapy until pain is better controlled and he has completed his lumbar MRI.  -Patient will follow-up after completing lumbar MRI.  Further recommendations may include epidural steroid injections after review of imaging.          Other Visit Diagnoses         Codes    Lumbar radiculopathy    -  Primary M54.16    Relevant Medications    DULoxetine (Cymbalta) 30 mg DR capsule    DULoxetine (Cymbalta) 60 mg DR capsule (Start on 4/4/2024)    Other Relevant Orders    MR lumbar spine wo IV contrast

## 2024-04-16 ENCOUNTER — HOSPITAL ENCOUNTER (OUTPATIENT)
Dept: RADIOLOGY | Facility: HOSPITAL | Age: 59
Discharge: HOME | End: 2024-04-16
Payer: COMMERCIAL

## 2024-04-16 DIAGNOSIS — M54.16 LUMBAR RADICULOPATHY: ICD-10-CM

## 2024-04-16 PROCEDURE — 72148 MRI LUMBAR SPINE W/O DYE: CPT | Performed by: RADIOLOGY

## 2024-04-16 PROCEDURE — 72148 MRI LUMBAR SPINE W/O DYE: CPT

## 2024-04-22 ENCOUNTER — OFFICE VISIT (OUTPATIENT)
Dept: PAIN MEDICINE | Facility: HOSPITAL | Age: 59
End: 2024-04-22
Payer: COMMERCIAL

## 2024-04-22 VITALS
BODY MASS INDEX: 26.41 KG/M2 | HEART RATE: 61 BPM | WEIGHT: 195 LBS | SYSTOLIC BLOOD PRESSURE: 117 MMHG | OXYGEN SATURATION: 98 % | DIASTOLIC BLOOD PRESSURE: 78 MMHG | HEIGHT: 72 IN | RESPIRATION RATE: 16 BRPM

## 2024-04-22 DIAGNOSIS — G89.29 CHRONIC BILATERAL LOW BACK PAIN WITH BILATERAL SCIATICA: ICD-10-CM

## 2024-04-22 DIAGNOSIS — M47.816 LUMBAR SPONDYLOSIS: ICD-10-CM

## 2024-04-22 DIAGNOSIS — M54.42 CHRONIC BILATERAL LOW BACK PAIN WITH BILATERAL SCIATICA: ICD-10-CM

## 2024-04-22 DIAGNOSIS — M54.16 LUMBAR NEURITIS: Primary | ICD-10-CM

## 2024-04-22 DIAGNOSIS — M54.41 CHRONIC BILATERAL LOW BACK PAIN WITH BILATERAL SCIATICA: ICD-10-CM

## 2024-04-22 PROCEDURE — 3074F SYST BP LT 130 MM HG: CPT | Performed by: CLINICAL NURSE SPECIALIST

## 2024-04-22 PROCEDURE — 3078F DIAST BP <80 MM HG: CPT | Performed by: CLINICAL NURSE SPECIALIST

## 2024-04-22 PROCEDURE — 99214 OFFICE O/P EST MOD 30 MIN: CPT | Performed by: CLINICAL NURSE SPECIALIST

## 2024-04-22 RX ORDER — GABAPENTIN 300 MG/1
CAPSULE ORAL
Qty: 180 CAPSULE | Refills: 2 | Status: SHIPPED | OUTPATIENT
Start: 2024-04-22 | End: 2024-05-29 | Stop reason: SDUPTHER

## 2024-04-22 ASSESSMENT — PATIENT HEALTH QUESTIONNAIRE - PHQ9
2. FEELING DOWN, DEPRESSED OR HOPELESS: NOT AT ALL
1. LITTLE INTEREST OR PLEASURE IN DOING THINGS: NOT AT ALL
SUM OF ALL RESPONSES TO PHQ9 QUESTIONS 1 AND 2: 0

## 2024-04-22 ASSESSMENT — ENCOUNTER SYMPTOMS
OCCASIONAL FEELINGS OF UNSTEADINESS: 0
LOSS OF SENSATION IN FEET: 0
DEPRESSION: 0

## 2024-04-22 NOTE — H&P (VIEW-ONLY)
Subjective   Patient ID: Joaquim Batres is a 58 y.o. male who presents for lumbar radicular pain    HPI    58-year-old male presents for follow-up of lumbar radicular pain and review of MRI.  Medical history pertinent for hypertension and depression.  Patient has a history of low back pain which started approximately 10 months ago without an inciting event.  Previous x-ray consistent with degenerative changes particularly at L4-5.  Patient was sent for lumbar MRI at his last office visit secondary to failure with conservative treatment.  Patient failed previous physical therapy.  He was also taking a large amount of Advil/Tylenol.  He had tried a steroid taper that provided limited relief.  We had added gabapentin 600 mg 3 times daily and duloxetine for neuropathic/radicular symptoms.  He presents at today's office visit for review of MRI.  Lumbar MRI consistent with significant wear-and-tear at L3-4 and L4-5; severe narrowing with degenerative changes at L4-5; patient also has a left facet cyst at L4-5.  Patient has low back pain which radiates into bilateral hips and lower extremities left greater than right.  Pain radiates  posteriorly into bilateral lower extremities to mid calf.  He has numbness and tingling bilateral lower extremities, denies weakness or changes in bowel/bladder function.  His pain can be sharp and electrical.  Standing, walking and sitting for long periods of time increases his pain.  Pain interrupts his sleep.  Currently managing his pain with gabapentin 600 mg 3 times daily and duloxetine 60 mg daily.  Previously had noted increasing drowsiness with gabapentin.  He states that he still feels drowsy during the day, however, he feels that it is tolerable and it currently does not interfere with his ability to to work.  He does feel that the gabapentin and duloxetine have been beneficial for radicular/neuropathic pain.  He has decreased his use of dual action Advil/Tylenol to no more than  6/day.  Reviewed MRI with patient and Dr. Huang. Recommending he consider  injections.        Pt is here for low back pain that intermittently radiates into bilateral posterior legs. Currently not radiating. Describes as sharp, electrical pain.     MRI Completed/No PT    5/10    OA 1/31/24    Medications: Advil Dual- 6 a day/Cymbalta/Gabapentin- needs refill  OARRS:  No data recorded  I have personally reviewed the OARRS report for Joaquim Batres. I have considered the risks of abuse, dependence, addiction and diversion    Is the patient prescribed a combination of a benzodiazepine and opioid?  No    Last Urine Drug Screen / ordered today: No  No results found for this or any previous visit (from the past 8760 hour(s)).  N/A    Controlled Substance Agreement:  Date of the Last Agreement:       Monitoring and compliance:    ORT:    PDUQ:    Office Agreement:      Review of Systems    ROS:   General: No fevers, chills, weight loss  Skin: Negative for lesions  Eyes: No acute vision changes  Ears: No vertigo  Nose, mouth, throat: No difficulty swallowing or speaking  Respiratory: No cough, shortness of breath, cyanosis  Cardiovascular: Negative for chest pain syncope or palpitation  Gastrointestinal: No constipation, nausea, vomiting  Neurological: Negative for headache, positive for: Paresthesia  Psychological: Negative for severe or debilitating anxiety, depression. Negative memory loss  Musculoskeletal: Positive for arthralgia, myalgia and pain  Endocrine: Negative for weight gain, excessive sweating  Allergy/immune: Negative    All 13 systems were reviewed and are within normal levels except as noted or in the history of present illness.  Positive or pertinent negative responses are noted or were in the history of present illness. As noted, the patient denies significant or impairing weakness in the bilateral upper and lower extremities, medication induced constipation, and bowel or bladder incontinence.      Current Outpatient Medications:     buPROPion XL (Wellbutrin XL) 150 mg 24 hr tablet, Take 1 tablet (150 mg) by mouth once daily., Disp: 90 tablet, Rfl: 3    DULoxetine (Cymbalta) 30 mg DR capsule, Take 1 capsule (30 mg) by mouth once daily for 7 days. Do not crush or chew., Disp: 7 capsule, Rfl: 0    DULoxetine (Cymbalta) 60 mg DR capsule, Take 1 capsule (60 mg) by mouth once daily. Do not crush or chew. Do not start before April 4, 2024., Disp: 30 capsule, Rfl: 2    gabapentin (Neurontin) 300 mg capsule, Day 1 Take 300mg at bedtime, Day 2 300mg BID, Day 3 300mg TID, Day 4-6 300mg AM 300mg afternoon 600mg at bedtime, Day 7-9 300mg Am, 600mg afternoon, 600mg at bedtime, Day 10 and after 600mg TID., Disp: 180 capsule, Rfl: 2    ibuprofen-acetaminophen (AdviL Dual Action) 125-250 mg tablet per tablet, Take by mouth every 8 hours., Disp: , Rfl:     lisinopril 10 mg tablet, Take 1 tablet (10 mg) by mouth once daily. for blood pressure, Disp: 90 tablet, Rfl: 3     Past Medical History:   Diagnosis Date    Other forms of angina pectoris (CMS-[facility]) 09/03/2014    Angina at rest    Unspecified injury of right wrist, hand and finger(s), sequela 02/17/2018    Injury of right index finger, sequela        No past surgical history on file.     Family History   Problem Relation Name Age of Onset    No Known Problems Mother      No Known Problems Father          No Known Allergies     Objective     Visit Vitals  Smoking Status Former        Physical Exam    PE:  General: Well-developed, well-nourished, no acute distress. The patient demonstrates no pain behavior, symptom magnification or overt drug-seeking behavior.  Eye: Pupils appropriate for room lighting  Neck/thyroid: No obvious goiter or enlargement of neck noted  Respiratory exam: Normal respiratory effort, unlabored respiration. No accessory muscle use noted  Cardiac exam: Bilateral radial pulses intact  Abdominal: Nondistended  Spine, lumbar: The patient is able to rise  from a seated to standing position without hesitancy, push off, or delay. Gait is slow and deliberate.  Posture is forward leaning.  Lower lumbar spine.  Flexion and extension both increase the patient's pain with flexion reproducing symptoms to left lower extremity.Tenderness to paraspinous musculature is noted lower lumbar spine.  Positive straight leg raise bilaterally.  Neurologic exam: Muscle strength is antigravity in all 4 extremities.  Equal muscle strength bilateral lower extremities 5/5.  Normal sensation bilateral lower extremities.  Psychiatric exam: Judgment and insight normal, affect normal, speech is fluent, affect appropriate, demonstrating no signs of hypersomnolence, sedation, or confusion      Assessment/Plan   Problem List Items Addressed This Visit             ICD-10-CM    Chronic bilateral low back pain with bilateral sciatica M54.42, M54.41, G89.29    Lumbar neuritis - Primary M54.16     58-year-old male presents for follow-up of lower back pain radiating to bilateral lower extremities.  Degenerative changes L3-4 and L4-5 with severe narrowing at L4-5; left L4-5 facet cyst.  Symptoms and exam consistent with a lumbar neuritis.  Positive shopping cart sign and straight leg raise bilaterally.  Tolerating gabapentin 600 mg 3 times daily with some drowsiness, however, patient feels it is tolerable and not interfering with his ability to work.  He does feel the addition of duloxetine has also been helpful for neuropathic/radicular symptoms.  Imaging and patient presentation reviewed with Dr. Russo suggesting patient try an interlaminar lumbar epidural steroid injection at L4-5 targeting lumbar neuritis as well as a left L4-5 facet cyst injection.  Reviewed risks and benefits with patient and he would like to proceed with these injections.  At this time we will continue the patient's gabapentin and duloxetine as prescribed.  Patient may also continue supplementing with dual action Advil/Tylenol.   Plan reviewed with patient at today's visit.    -Patient scheduled for interlaminar lumbar epidural steroid injection at L4-5 with left L4-5 facet cyst injection.  CPT 54088.  Reviewed potential risks and benefits with patient.  -Continue gabapentin 600 mg 3 times daily.  The patient was counseled on the risks and potential side effects of gabapentin as well as its importance for dosage titration. Side effects included but were not limited to, drowsiness, sedation, cognitive decline, peripheral edema, weight gain, seizures, with abrupt withdrawal.  Patient was instructed to call the office with any concerns or side effects before abruptly stopping medication.   -Continue duloxetine 60 mg daily.  -Patient will continue to supplement with dual action Advil/Tylenol.  Recommended patient limit use of both NSAIDs and Tylenol.  Patient states he limits use of dual action Advil/Tylenol to no more than 6/day.  The patient was cautioned about possible side effects and risks of this medication including stomach upset, stomach ulcers, kidney risks and cardiovascular risks to include hypertension and slightly increased risks of stroke and myocardial infarction. Also discussed were ways to minimize these risks by taking this medication with food, staying well-hydrated, watching for any hypertension, and taking the medication with a stomach protectant such as pepcid, zantac, or a proton pump inhibitor.   -Follow-up 4 weeks after his injection for reevaluation.    Disclaimer: This note was transcribed using an audio transcription device.  As such, minor errors may be present with regard to spelling, punctuation, and inadvertent word insertion.  Please disregard such errors.           Relevant Medications    gabapentin (Neurontin) 300 mg capsule    Other Relevant Orders    Epidural Steroid Injection    Lumbar spondylosis M47.816    Relevant Medications    gabapentin (Neurontin) 300 mg capsule

## 2024-04-22 NOTE — PROGRESS NOTES
Subjective   Patient ID: Joaquim Batres is a 58 y.o. male who presents for lumbar radicular pain    HPI    58-year-old male presents for follow-up of lumbar radicular pain and review of MRI.  Medical history pertinent for hypertension and depression.  Patient has a history of low back pain which started approximately 10 months ago without an inciting event.  Previous x-ray consistent with degenerative changes particularly at L4-5.  Patient was sent for lumbar MRI at his last office visit secondary to failure with conservative treatment.  Patient failed previous physical therapy.  He was also taking a large amount of Advil/Tylenol.  He had tried a steroid taper that provided limited relief.  We had added gabapentin 600 mg 3 times daily and duloxetine for neuropathic/radicular symptoms.  He presents at today's office visit for review of MRI.  Lumbar MRI consistent with significant wear-and-tear at L3-4 and L4-5; severe narrowing with degenerative changes at L4-5; patient also has a left facet cyst at L4-5.  Patient has low back pain which radiates into bilateral hips and lower extremities left greater than right.  Pain radiates  posteriorly into bilateral lower extremities to mid calf.  He has numbness and tingling bilateral lower extremities, denies weakness or changes in bowel/bladder function.  His pain can be sharp and electrical.  Standing, walking and sitting for long periods of time increases his pain.  Pain interrupts his sleep.  Currently managing his pain with gabapentin 600 mg 3 times daily and duloxetine 60 mg daily.  Previously had noted increasing drowsiness with gabapentin.  He states that he still feels drowsy during the day, however, he feels that it is tolerable and it currently does not interfere with his ability to to work.  He does feel that the gabapentin and duloxetine have been beneficial for radicular/neuropathic pain.  He has decreased his use of dual action Advil/Tylenol to no more than  6/day.  Reviewed MRI with patient and Dr. Huang. Recommending he consider  injections.        Pt is here for low back pain that intermittently radiates into bilateral posterior legs. Currently not radiating. Describes as sharp, electrical pain.     MRI Completed/No PT    5/10    OA 1/31/24    Medications: Advil Dual- 6 a day/Cymbalta/Gabapentin- needs refill  OARRS:  No data recorded  I have personally reviewed the OARRS report for Joaquim Batres. I have considered the risks of abuse, dependence, addiction and diversion    Is the patient prescribed a combination of a benzodiazepine and opioid?  No    Last Urine Drug Screen / ordered today: No  No results found for this or any previous visit (from the past 8760 hour(s)).  N/A    Controlled Substance Agreement:  Date of the Last Agreement:       Monitoring and compliance:    ORT:    PDUQ:    Office Agreement:      Review of Systems    ROS:   General: No fevers, chills, weight loss  Skin: Negative for lesions  Eyes: No acute vision changes  Ears: No vertigo  Nose, mouth, throat: No difficulty swallowing or speaking  Respiratory: No cough, shortness of breath, cyanosis  Cardiovascular: Negative for chest pain syncope or palpitation  Gastrointestinal: No constipation, nausea, vomiting  Neurological: Negative for headache, positive for: Paresthesia  Psychological: Negative for severe or debilitating anxiety, depression. Negative memory loss  Musculoskeletal: Positive for arthralgia, myalgia and pain  Endocrine: Negative for weight gain, excessive sweating  Allergy/immune: Negative    All 13 systems were reviewed and are within normal levels except as noted or in the history of present illness.  Positive or pertinent negative responses are noted or were in the history of present illness. As noted, the patient denies significant or impairing weakness in the bilateral upper and lower extremities, medication induced constipation, and bowel or bladder incontinence.      Current Outpatient Medications:     buPROPion XL (Wellbutrin XL) 150 mg 24 hr tablet, Take 1 tablet (150 mg) by mouth once daily., Disp: 90 tablet, Rfl: 3    DULoxetine (Cymbalta) 30 mg DR capsule, Take 1 capsule (30 mg) by mouth once daily for 7 days. Do not crush or chew., Disp: 7 capsule, Rfl: 0    DULoxetine (Cymbalta) 60 mg DR capsule, Take 1 capsule (60 mg) by mouth once daily. Do not crush or chew. Do not start before April 4, 2024., Disp: 30 capsule, Rfl: 2    gabapentin (Neurontin) 300 mg capsule, Day 1 Take 300mg at bedtime, Day 2 300mg BID, Day 3 300mg TID, Day 4-6 300mg AM 300mg afternoon 600mg at bedtime, Day 7-9 300mg Am, 600mg afternoon, 600mg at bedtime, Day 10 and after 600mg TID., Disp: 180 capsule, Rfl: 2    ibuprofen-acetaminophen (AdviL Dual Action) 125-250 mg tablet per tablet, Take by mouth every 8 hours., Disp: , Rfl:     lisinopril 10 mg tablet, Take 1 tablet (10 mg) by mouth once daily. for blood pressure, Disp: 90 tablet, Rfl: 3     Past Medical History:   Diagnosis Date    Other forms of angina pectoris (CMS-[facility]) 09/03/2014    Angina at rest    Unspecified injury of right wrist, hand and finger(s), sequela 02/17/2018    Injury of right index finger, sequela        No past surgical history on file.     Family History   Problem Relation Name Age of Onset    No Known Problems Mother      No Known Problems Father          No Known Allergies     Objective     Visit Vitals  Smoking Status Former        Physical Exam    PE:  General: Well-developed, well-nourished, no acute distress. The patient demonstrates no pain behavior, symptom magnification or overt drug-seeking behavior.  Eye: Pupils appropriate for room lighting  Neck/thyroid: No obvious goiter or enlargement of neck noted  Respiratory exam: Normal respiratory effort, unlabored respiration. No accessory muscle use noted  Cardiac exam: Bilateral radial pulses intact  Abdominal: Nondistended  Spine, lumbar: The patient is able to rise  from a seated to standing position without hesitancy, push off, or delay. Gait is slow and deliberate.  Posture is forward leaning.  Lower lumbar spine.  Flexion and extension both increase the patient's pain with flexion reproducing symptoms to left lower extremity.Tenderness to paraspinous musculature is noted lower lumbar spine.  Positive straight leg raise bilaterally.  Neurologic exam: Muscle strength is antigravity in all 4 extremities.  Equal muscle strength bilateral lower extremities 5/5.  Normal sensation bilateral lower extremities.  Psychiatric exam: Judgment and insight normal, affect normal, speech is fluent, affect appropriate, demonstrating no signs of hypersomnolence, sedation, or confusion      Assessment/Plan   Problem List Items Addressed This Visit             ICD-10-CM    Chronic bilateral low back pain with bilateral sciatica M54.42, M54.41, G89.29    Lumbar neuritis - Primary M54.16     58-year-old male presents for follow-up of lower back pain radiating to bilateral lower extremities.  Degenerative changes L3-4 and L4-5 with severe narrowing at L4-5; left L4-5 facet cyst.  Symptoms and exam consistent with a lumbar neuritis.  Positive shopping cart sign and straight leg raise bilaterally.  Tolerating gabapentin 600 mg 3 times daily with some drowsiness, however, patient feels it is tolerable and not interfering with his ability to work.  He does feel the addition of duloxetine has also been helpful for neuropathic/radicular symptoms.  Imaging and patient presentation reviewed with Dr. Russo suggesting patient try an interlaminar lumbar epidural steroid injection at L4-5 targeting lumbar neuritis as well as a left L4-5 facet cyst injection.  Reviewed risks and benefits with patient and he would like to proceed with these injections.  At this time we will continue the patient's gabapentin and duloxetine as prescribed.  Patient may also continue supplementing with dual action Advil/Tylenol.   Plan reviewed with patient at today's visit.    -Patient scheduled for interlaminar lumbar epidural steroid injection at L4-5 with left L4-5 facet cyst injection.  CPT 95881.  Reviewed potential risks and benefits with patient.  -Continue gabapentin 600 mg 3 times daily.  The patient was counseled on the risks and potential side effects of gabapentin as well as its importance for dosage titration. Side effects included but were not limited to, drowsiness, sedation, cognitive decline, peripheral edema, weight gain, seizures, with abrupt withdrawal.  Patient was instructed to call the office with any concerns or side effects before abruptly stopping medication.   -Continue duloxetine 60 mg daily.  -Patient will continue to supplement with dual action Advil/Tylenol.  Recommended patient limit use of both NSAIDs and Tylenol.  Patient states he limits use of dual action Advil/Tylenol to no more than 6/day.  The patient was cautioned about possible side effects and risks of this medication including stomach upset, stomach ulcers, kidney risks and cardiovascular risks to include hypertension and slightly increased risks of stroke and myocardial infarction. Also discussed were ways to minimize these risks by taking this medication with food, staying well-hydrated, watching for any hypertension, and taking the medication with a stomach protectant such as pepcid, zantac, or a proton pump inhibitor.   -Follow-up 4 weeks after his injection for reevaluation.    Disclaimer: This note was transcribed using an audio transcription device.  As such, minor errors may be present with regard to spelling, punctuation, and inadvertent word insertion.  Please disregard such errors.           Relevant Medications    gabapentin (Neurontin) 300 mg capsule    Other Relevant Orders    Epidural Steroid Injection    Lumbar spondylosis M47.816    Relevant Medications    gabapentin (Neurontin) 300 mg capsule

## 2024-04-22 NOTE — ASSESSMENT & PLAN NOTE
58-year-old male presents for follow-up of lower back pain radiating to bilateral lower extremities.  Degenerative changes L3-4 and L4-5 with severe narrowing at L4-5; left L4-5 facet cyst.  Symptoms and exam consistent with a lumbar neuritis.  Positive shopping cart sign and straight leg raise bilaterally.  Tolerating gabapentin 600 mg 3 times daily with some drowsiness, however, patient feels it is tolerable and not interfering with his ability to work.  He does feel the addition of duloxetine has also been helpful for neuropathic/radicular symptoms.  Imaging and patient presentation reviewed with Dr. Huang suggesting patient try an interlaminar lumbar epidural steroid injection at L4-5 targeting lumbar neuritis as well as a left L4-5 facet cyst injection.  Reviewed risks and benefits with patient and he would like to proceed with these injections.  At this time we will continue the patient's gabapentin and duloxetine as prescribed.  Patient may also continue supplementing with dual action Advil/Tylenol.  Plan reviewed with patient at today's visit.    -Patient scheduled for interlaminar lumbar epidural steroid injection at L4-5 with left L4-5 facet cyst injection.  CPT 04282.  Reviewed potential risks and benefits with patient.  -Continue gabapentin 600 mg 3 times daily.  The patient was counseled on the risks and potential side effects of gabapentin as well as its importance for dosage titration. Side effects included but were not limited to, drowsiness, sedation, cognitive decline, peripheral edema, weight gain, seizures, with abrupt withdrawal.  Patient was instructed to call the office with any concerns or side effects before abruptly stopping medication.   -Continue duloxetine 60 mg daily.  -Patient will continue to supplement with dual action Advil/Tylenol.  Recommended patient limit use of both NSAIDs and Tylenol.  Patient states he limits use of dual action Advil/Tylenol to no more than 6/day.  The  patient was cautioned about possible side effects and risks of this medication including stomach upset, stomach ulcers, kidney risks and cardiovascular risks to include hypertension and slightly increased risks of stroke and myocardial infarction. Also discussed were ways to minimize these risks by taking this medication with food, staying well-hydrated, watching for any hypertension, and taking the medication with a stomach protectant such as pepcid, zantac, or a proton pump inhibitor.   -Follow-up 4 weeks after his injection for reevaluation.    Disclaimer: This note was transcribed using an audio transcription device.  As such, minor errors may be present with regard to spelling, punctuation, and inadvertent word insertion.  Please disregard such errors.

## 2024-04-29 ENCOUNTER — APPOINTMENT (OUTPATIENT)
Dept: PAIN MEDICINE | Facility: HOSPITAL | Age: 59
End: 2024-04-29
Payer: COMMERCIAL

## 2024-05-09 ENCOUNTER — HOSPITAL ENCOUNTER (OUTPATIENT)
Dept: RADIOLOGY | Facility: HOSPITAL | Age: 59
Discharge: HOME | End: 2024-05-09
Payer: COMMERCIAL

## 2024-05-09 ENCOUNTER — HOSPITAL ENCOUNTER (OUTPATIENT)
Dept: GASTROENTEROLOGY | Facility: HOSPITAL | Age: 59
Discharge: HOME | End: 2024-05-09
Payer: COMMERCIAL

## 2024-05-09 VITALS
SYSTOLIC BLOOD PRESSURE: 125 MMHG | OXYGEN SATURATION: 96 % | HEART RATE: 58 BPM | DIASTOLIC BLOOD PRESSURE: 75 MMHG | TEMPERATURE: 97.1 F | RESPIRATION RATE: 14 BRPM

## 2024-05-09 DIAGNOSIS — M71.38 CYST OF LUMBAR FACET JOINT: ICD-10-CM

## 2024-05-09 DIAGNOSIS — M54.16 LUMBAR NEURITIS: ICD-10-CM

## 2024-05-09 DIAGNOSIS — M54.16 LUMBAR NEURITIS: Primary | ICD-10-CM

## 2024-05-09 PROCEDURE — 62323 NJX INTERLAMINAR LMBR/SAC: CPT | Performed by: PHYSICAL MEDICINE & REHABILITATION

## 2024-05-09 PROCEDURE — 64493 INJ PARAVERT F JNT L/S 1 LEV: CPT | Performed by: PHYSICAL MEDICINE & REHABILITATION

## 2024-05-09 PROCEDURE — 2550000001 HC RX 255 CONTRASTS: Performed by: PHYSICAL MEDICINE & REHABILITATION

## 2024-05-09 PROCEDURE — 2500000005 HC RX 250 GENERAL PHARMACY W/O HCPCS: Performed by: PHYSICAL MEDICINE & REHABILITATION

## 2024-05-09 PROCEDURE — 2500000004 HC RX 250 GENERAL PHARMACY W/ HCPCS (ALT 636 FOR OP/ED): Performed by: PHYSICAL MEDICINE & REHABILITATION

## 2024-05-09 PROCEDURE — 7100000010 HC PHASE TWO TIME - EACH INCREMENTAL 1 MINUTE

## 2024-05-09 PROCEDURE — 64493 INJ PARAVERT F JNT L/S 1 LEV: CPT | Mod: LT | Performed by: PHYSICAL MEDICINE & REHABILITATION

## 2024-05-09 PROCEDURE — 7100000009 HC PHASE TWO TIME - INITIAL BASE CHARGE

## 2024-05-09 RX ORDER — DULOXETIN HYDROCHLORIDE 60 MG/1
60 CAPSULE, DELAYED RELEASE ORAL DAILY
Qty: 30 CAPSULE | Refills: 2 | Status: SHIPPED | OUTPATIENT
Start: 2024-05-09

## 2024-05-09 RX ORDER — METHYLPREDNISOLONE ACETATE 40 MG/ML
INJECTION, SUSPENSION INTRA-ARTICULAR; INTRALESIONAL; INTRAMUSCULAR; SOFT TISSUE AS NEEDED
Status: COMPLETED | OUTPATIENT
Start: 2024-05-09 | End: 2024-05-09

## 2024-05-09 RX ORDER — LIDOCAINE HYDROCHLORIDE 5 MG/ML
INJECTION, SOLUTION INFILTRATION; INTRAVENOUS AS NEEDED
Status: COMPLETED | OUTPATIENT
Start: 2024-05-09 | End: 2024-05-09

## 2024-05-09 RX ADMIN — METHYLPREDNISOLONE ACETATE 40 MG: 40 INJECTION, SUSPENSION INTRA-ARTICULAR; INTRALESIONAL; INTRAMUSCULAR; SOFT TISSUE at 10:37

## 2024-05-09 RX ADMIN — LIDOCAINE HYDROCHLORIDE 15 ML: 5 INJECTION, SOLUTION INFILTRATION at 10:35

## 2024-05-09 RX ADMIN — IOHEXOL 5 ML: 350 INJECTION, SOLUTION INTRAVENOUS at 10:37

## 2024-05-09 ASSESSMENT — PAIN SCALES - GENERAL
PAINLEVEL_OUTOF10: 0 - NO PAIN
PAINLEVEL_OUTOF10: 0 - NO PAIN

## 2024-05-09 ASSESSMENT — PAIN - FUNCTIONAL ASSESSMENT: PAIN_FUNCTIONAL_ASSESSMENT: 0-10

## 2024-05-09 NOTE — DISCHARGE INSTRUCTIONS
You had a pain management procedure today.    Observe/ monitor for the following signs & symptoms:  If you notice Excessive bleeding (slow general oozing that completely soaks the dressing, or fresh bright red bleeding).   In either case, apply pressure to the area, elevate it if possible & call your doctor at once.    Also observe for:  Change in color  Numbness/tingling  Coldness to the touch  Swelling  Drainage  Temperature of 101.5 or higher.  Increased, uncontrollable pain.    *If you notice the above signs & symptoms, please call your doctor right away!*      Discharge Instructions:    Your pain may not be gone immediately after this procedure; it generally takes 3 to 5 days for the steroid to work.   Keep the needle site clean & dry for 24 hours.  Continue your present medications.  Make an appointment to see your doctor in 2-3 weeks.  If any problems occur, or if you have any further questions, please call as soon as possible. If you find that you cannot reach your doctor, but feel that the condition nees a doctor's attention, go to the closest emergency department & take this discharge paper with you.       Dr. Huang's Office: [phone]

## 2024-05-09 NOTE — PROCEDURES
General    Date/Time: 5/9/2024 10:40 AM    Performed by: Ubaldo Huang MD  Authorized by: Ubaldo Huang MD    Consent:     Consent obtained:  Written    Consent given by:  Patient    Risks, benefits, and alternatives were discussed: yes      Risks discussed:  Bleeding, infection, pain and nerve damage    Alternatives discussed:  No treatment, delayed treatment and alternative treatment  Universal protocol:     Procedure explained and questions answered to patient or proxy's satisfaction: yes      Relevant documents present and verified: yes      Test results available: yes      Imaging studies available: yes      Required blood products, implants, devices, and special equipment available: yes      Site/side marked: yes      Immediately prior to procedure, a time out was called: yes      Patient identity confirmed:  Verbally with patient, hospital-assigned identification number and arm band  Procedure specific details:      After informed consent was obtained, the patient was brought to the operating   room and placed in the prone position.  The back area was prepped and draped in   usual sterile fashion.  Using fluoroscopic guidance, the skin and subcutaneous   tissue overlying needle trajectory to the below interlaminar epidural space   were anesthetized with a total of 5 mL of 0.5% lidocaine in the below   paraspinous approach.  An 18-gauge Tuohy needle was then advanced under   fluoroscopic guidance with the below paraspinous approach into the below   interlaminar epidural space. Entry of the epidural space was confirmed using   loss of resistance technique with a glass syringe and 2 mL of air.  Injection   of Iohexol contrast revealed appropriate spread without vascular uptake.   Subsequently, the below medications were injected.  The needle was removed.   The patient tolerated the procedure well.        Level: L4-L5  Medications [6 mL of 0.5% lidocaine and 40 mg methylprednisolone] - 5 mL of the    solution injected epidurally, 2 mL injected into the below facet joint       The patient was positioned comfortably in the prone position and was prepped   and draped in the usual sterile manner.  Sterile precautions, including sterile   gloves, disposable cap and masks were worn for the procedure at all times.   Skeletal landmarks including the appropriate level were identified under   fluoroscopy. There was no evidence of infection at the site of needle   insertion.          The skin and subcutaneous tissue at insertion site was anesthetized with 1%   lidocaine with or without sodium bicarbonate.       The needle was inserted and advanced under fluoroscopic guidance to each joint.    Placement inside the facet joint(s) was confirmed with injection of radiopaque   contrast dye.  Subsequently contrast was injected under live fluoroscopy until   it appeared that the facet cyst had ruptured and epidural spread was then   noted.  The joint(s) was injected with therapeutic medication.       At the procedure conclusion, the needle was flushed and/or restyletted and   removed, Pressure was held, and after ensuring hemostasis and the absence of   hematoma, an adhesive bandage was applied to the site of needle insertion.       Preservative-free solutions were utilized.       Level: L4-L5  Laterality: Left   Needle: 22-gauge 3.5 inch quincke spinal   Therapeutic Medications: See above

## 2024-05-29 ENCOUNTER — OFFICE VISIT (OUTPATIENT)
Dept: PAIN MEDICINE | Facility: HOSPITAL | Age: 59
End: 2024-05-29
Payer: COMMERCIAL

## 2024-05-29 VITALS
BODY MASS INDEX: 26.11 KG/M2 | OXYGEN SATURATION: 97 % | SYSTOLIC BLOOD PRESSURE: 126 MMHG | HEIGHT: 72 IN | DIASTOLIC BLOOD PRESSURE: 75 MMHG | HEART RATE: 56 BPM | WEIGHT: 192.8 LBS | RESPIRATION RATE: 18 BRPM

## 2024-05-29 DIAGNOSIS — M47.816 LUMBAR SPONDYLOSIS: ICD-10-CM

## 2024-05-29 DIAGNOSIS — M54.16 LUMBAR NEURITIS: ICD-10-CM

## 2024-05-29 PROCEDURE — 99214 OFFICE O/P EST MOD 30 MIN: CPT | Performed by: CLINICAL NURSE SPECIALIST

## 2024-05-29 PROCEDURE — 3078F DIAST BP <80 MM HG: CPT | Performed by: CLINICAL NURSE SPECIALIST

## 2024-05-29 PROCEDURE — 3074F SYST BP LT 130 MM HG: CPT | Performed by: CLINICAL NURSE SPECIALIST

## 2024-05-29 RX ORDER — GABAPENTIN 300 MG/1
CAPSULE ORAL
Qty: 180 CAPSULE | Refills: 2 | Status: SHIPPED | OUTPATIENT
Start: 2024-05-29

## 2024-05-29 ASSESSMENT — PATIENT HEALTH QUESTIONNAIRE - PHQ9
1. LITTLE INTEREST OR PLEASURE IN DOING THINGS: NOT AT ALL
SUM OF ALL RESPONSES TO PHQ9 QUESTIONS 1 AND 2: 0
2. FEELING DOWN, DEPRESSED OR HOPELESS: NOT AT ALL

## 2024-05-29 ASSESSMENT — ENCOUNTER SYMPTOMS
DEPRESSION: 0
OCCASIONAL FEELINGS OF UNSTEADINESS: 1
LOSS OF SENSATION IN FEET: 0

## 2024-05-29 ASSESSMENT — PAIN SCALES - GENERAL: PAINLEVEL: 1

## 2024-05-29 NOTE — ASSESSMENT & PLAN NOTE
58-year-old male presents for follow-up of lower back pain radiating to bilateral lower extremities.  MRI consistent with degenerative changes L3-4 and L4-5 with severe narrowing at L4-5; left L4-5 facet cyst.  Patient proceeded with interlaminar lumbar epidural steroid injection at L4-5 and L4-5 left facet cyst injection on 5/9/2024.  He states he received significant relief; 90 to 100% relief lasting 2 to 3 weeks.  He is noting return of symptoms with increasing radicular symptoms in bilateral lower extremities.  Reviewed imaging and patient presentation with Dr. Huang suggesting patient may benefit from a more targeted injection such as a transforaminal epidural steroid injection to provide longer lasting relief.  Reviewed risks and benefits and the patient would like to proceed.  Patient felt he received significant relief with his injection, however, he would like more sustained relief.  Pain interferes with his daily activities and makes it difficult for him to work.  Symptoms and exam remain consistent with a lumbar neuritis.  He again exhibits a positive shopping cart sign and straight leg raise bilaterally.  Tolerating gabapentin 600 mg 3 times daily and duloxetine 60 mg daily.  Advised patient to not interrupt these medications and take consistently for optimal pain relief.  Patient may also continue supplementing with dual action Advil/Tylenol limiting his dose to no more than 6/day.  Plan reviewed with patient at today's visit.    -Patient scheduled for bilateral L4 transforaminal lumbar epidural steroid injection under fluoroscopic guidance.  Reviewed risks and benefits and patient would like to proceed with this injection.  -Continue gabapentin 600 mg 3 times daily.  Advised patient to take on a consistent basis for optimal results.  The patient was counseled on the risks and potential side effects of gabapentin as well as its importance for dosage titration. Side effects included but were not  limited to, drowsiness, sedation, cognitive decline, peripheral edema, weight gain, seizures, with abrupt withdrawal.  Patient was instructed to call the office with any concerns or side effects before abruptly stopping medication.   -Continue duloxetine 60 mg daily.  Advised patient to take consistently for optimal pain relief.  -Patient will continue to supplement with dual action Advil/Tylenol.  Recommended patient limit use of both NSAIDs and Tylenol.  Patient states he limits use of dual action Advil/Tylenol to no more than 6/day.  The patient was cautioned about possible side effects and risks of this medication including stomach upset, stomach ulcers, kidney risks and cardiovascular risks to include hypertension and slightly increased risks of stroke and myocardial infarction. Also discussed were ways to minimize these risks by taking this medication with food, staying well-hydrated, watching for any hypertension, and taking the medication with a stomach protectant such as pepcid, zantac, or a proton pump inhibitor.   -Follow-up 4 weeks after his injection for reevaluation.    Disclaimer: This note was transcribed using an audio transcription device.  As such, minor errors may be present with regard to spelling, punctuation, and inadvertent word insertion.  Please disregard such errors.

## 2024-05-29 NOTE — H&P (VIEW-ONLY)
"   Follow Up Visit:  intralaminar LESI L4-L5  5/9/24    Location of Pain: lumbar neuritis, left facet cyst L4-L5  [place] improvement for 2 weeks after procedure. Last week, states noticed similar pain prior to procedure. Sitting aggravates pain, describes as \"locked up, shooting heavy pain\" Currently pain is not as frequent but seems to occurring every day past week. Pain radiates bilat legs to feet-sharp pain. Walking, moving helps. Does not interfere with sleep.      Pain Score: 1/10    Other pain medication/neuromodulator: duloxetine, gabapentin-will need refill    Therapeutic Goals:    Therapeutic Assessment:    Injections and/or Procedures: 5/9/24 LESI, L4-5 facet cyst injection    Other: PT few weeks prior to procedure-did not help  OA 1/31/24  Oswestry Score 18      Subjective   Patient ID: Joaquim Batres is a 58 y.o. male who presents for lumbar radicular pain  HPI    58-year-old male presents for follow-up of lumbar radicular pain.  Medical history pertinent for hypertension and depression.  Patient noticed lumbar radicular symptoms approximately 10 to 12 months ago without any inciting event.  Lumbar MRI consistent with significant wear and tear at L3-4 and L4-5; severe narrowing with degenerative changes at L4-5; patient also has a left facet cyst at L4-5.  Failed a formal course of physical therapy.  Previously treating his pain with a large amount of Advil/Tylenol.  Caution to decrease use of this medication.  He also tried a steroid taper that provided limited relief.  Added gabapentin 600 mg 3 times daily and duloxetine 60 mg daily which was beneficial.  After review of MRI he was scheduled for an interlaminar lumbar epidural steroid injection at L4-5 and an L4-5 left facet cyst injection.  He presents at today's office visit for follow-up after his recent injection on 5/9/2024.  Patient states he received significant relief; 90 to 100% relief for approximately 2 to 3 weeks.  He is noting return of " symptoms. Currently experiencing low back pain radiating into bilateral hips and lower extremities.  He has intermittent numbness and tingling to his bilateral lower extremities.  States that his legs may feel heavy after sitting for a long period of time.  He has most pain when he goes from seated to standing position and with bending.  He has noticed weakness in his lower extremities.  He denies any bowel or bladder changes.  His pain can be aching, sharp and shooting.  Managing pain with gabapentin 600 mg 3 times daily and duloxetine 60 mg daily.  He does admit to not taking these medications consistently secondary to improvement in pain after his injection.  He has restarted both of these medications and has been taking them consistently for the past 1 week.  He has decreased use of dual action Advil/Tylenol to no more than 6/day.    OARRS:  Trish Cali, APRN-CNP, APRN-CNS on 5/29/2024  4:22 PM  I have personally reviewed the OARRS report for Joaquim Batres. I have considered the risks of abuse, dependence, addiction and diversion    Is the patient prescribed a combination of a benzodiazepine and opioid?  No    Last Urine Drug Screen / ordered today: No  No results found for this or any previous visit (from the past 8760 hour(s)).  N/A    Controlled Substance Agreement:  Date of the Last Agreement:       Monitoring and compliance:    ORT:    PDUQ:    Office Agreement: 1/31/24  Oswestry score 18      Review of Systems    ROS:   General: No fevers, chills, weight loss  Skin: Negative for lesions  Eyes: No acute vision changes  Ears: No vertigo  Nose, mouth, throat: No difficulty swallowing or speaking  Respiratory: No cough, shortness of breath, cyanosis  Cardiovascular: Negative for chest pain syncope or palpitation  Gastrointestinal: No constipation, nausea, vomiting  Neurological: Negative for headache, positive for: Paresthesia and intermittent weakness lower extremities  Psychological: Negative for  severe or debilitating anxiety, depression. Negative memory loss  Musculoskeletal: Positive for arthralgia, myalgia and pain  Endocrine: Negative for weight gain, appetite changes, excessive sweating  Allergy/immune: Negative    All 13 systems were reviewed and are within normal levels except as noted or in the history of present illness.  Positive or pertinent negative responses are noted or were in the history of present illness. As noted, the patient denies significant or impairing weakness in the bilateral upper and lower extremities, medication induced constipation, and bowel or bladder incontinence.     Current Outpatient Medications:     buPROPion XL (Wellbutrin XL) 150 mg 24 hr tablet, Take 1 tablet (150 mg) by mouth once daily., Disp: 90 tablet, Rfl: 3    DULoxetine (Cymbalta) 60 mg DR capsule, Take 1 capsule (60 mg) by mouth once daily. Do not crush or chew., Disp: 30 capsule, Rfl: 2    ibuprofen-acetaminophen (AdviL Dual Action) 125-250 mg tablet per tablet, Take by mouth every 8 hours., Disp: , Rfl:     lisinopril 10 mg tablet, Take 1 tablet (10 mg) by mouth once daily. for blood pressure, Disp: 90 tablet, Rfl: 3    DULoxetine (Cymbalta) 60 mg DR capsule, Take 1 capsule (60 mg) by mouth once daily. Do not crush or chew. Do not start before April 4, 2024., Disp: 30 capsule, Rfl: 2    gabapentin (Neurontin) 300 mg capsule, Take 2 capsules (600mg) three times daily, Disp: 180 capsule, Rfl: 2     Past Medical History:   Diagnosis Date    Other forms of angina pectoris (CMS-formerly [facility]) 09/03/2014    Angina at rest    Unspecified injury of right wrist, hand and finger(s), sequela 02/17/2018    Injury of right index finger, sequela        History reviewed. No pertinent surgical history.     Family History   Problem Relation Name Age of Onset    No Known Problems Mother      No Known Problems Father          No Known Allergies     Objective     Visit Vitals  /75   Pulse 56   Resp 18   Ht 1.829 m (6')   Wt 87.5 kg  (192 lb 12.8 oz)   SpO2 97%   BMI 26.15 kg/m²   Smoking Status Former   BSA 2.11 m²        Physical Exam    PE:  General: Well-developed, well-nourished, no acute distress. The patient demonstrates no pain behavior, symptom magnification or overt drug-seeking behavior.  Eye: Pupils appropriate for room lighting  Neck/thyroid: No obvious goiter or enlargement of neck noted  Respiratory exam: Normal respiratory effort, unlabored respiration. No accessory muscle use noted  Cardiac exam: Bilateral radial pulses intact  Abdominal: Nondistended  Spine, lumbar: The patient is able to rise from a seated to standing position without hesitancy, push off, or delay. Gait is grossly nonantalgic.  Forward leaning posture.  Flexion intact with extension limited and increasing pain.  Tenderness to paraspinous musculature lower lumbar spine.  Positive straight leg raise bilaterally.  Neurologic exam: Muscle strength is antigravity in all 4 extremities.  Equal muscle strength bilateral lower extremities 5/5.  Normal sensation bilateral lower extremities.  Psychiatric exam: Judgment and insight normal, affect normal, speech is fluent, affect appropriate, demonstrating no signs of hypersomnolence, sedation, or confusion          Assessment/Plan   Problem List Items Addressed This Visit             ICD-10-CM    Lumbar neuritis M54.16     58-year-old male presents for follow-up of lower back pain radiating to bilateral lower extremities.  MRI consistent with degenerative changes L3-4 and L4-5 with severe narrowing at L4-5; left L4-5 facet cyst.  Patient proceeded with interlaminar lumbar epidural steroid injection at L4-5 and L4-5 left facet cyst injection on 5/9/2024.  He states he received significant relief; 90 to 100% relief lasting 2 to 3 weeks.  He is noting return of symptoms with increasing radicular symptoms in bilateral lower extremities.  Reviewed imaging and patient presentation with Dr. Huang suggesting patient may  benefit from a more targeted injection such as a transforaminal epidural steroid injection to provide longer lasting relief.  Reviewed risks and benefits and the patient would like to proceed.  Patient felt he received significant relief with his injection, however, he would like more sustained relief.  Pain interferes with his daily activities and makes it difficult for him to work.  Symptoms and exam remain consistent with a lumbar neuritis.  He again exhibits a positive shopping cart sign and straight leg raise bilaterally.  Tolerating gabapentin 600 mg 3 times daily and duloxetine 60 mg daily.  Advised patient to not interrupt these medications and take consistently for optimal pain relief.  Patient may also continue supplementing with dual action Advil/Tylenol limiting his dose to no more than 6/day.  Plan reviewed with patient at today's visit.    -Patient scheduled for bilateral L4 transforaminal lumbar epidural steroid injection under fluoroscopic guidance.  Reviewed risks and benefits and patient would like to proceed with this injection.  -Continue gabapentin 600 mg 3 times daily.  Advised patient to take on a consistent basis for optimal results.  The patient was counseled on the risks and potential side effects of gabapentin as well as its importance for dosage titration. Side effects included but were not limited to, drowsiness, sedation, cognitive decline, peripheral edema, weight gain, seizures, with abrupt withdrawal.  Patient was instructed to call the office with any concerns or side effects before abruptly stopping medication.   -Continue duloxetine 60 mg daily.  Advised patient to take consistently for optimal pain relief.  -Patient will continue to supplement with dual action Advil/Tylenol.  Recommended patient limit use of both NSAIDs and Tylenol.  Patient states he limits use of dual action Advil/Tylenol to no more than 6/day.  The patient was cautioned about possible side effects and risks of  this medication including stomach upset, stomach ulcers, kidney risks and cardiovascular risks to include hypertension and slightly increased risks of stroke and myocardial infarction. Also discussed were ways to minimize these risks by taking this medication with food, staying well-hydrated, watching for any hypertension, and taking the medication with a stomach protectant such as pepcid, zantac, or a proton pump inhibitor.   -Follow-up 4 weeks after his injection for reevaluation.    Disclaimer: This note was transcribed using an audio transcription device.  As such, minor errors may be present with regard to spelling, punctuation, and inadvertent word insertion.  Please disregard such errors.           Relevant Medications    gabapentin (Neurontin) 300 mg capsule    Other Relevant Orders    Epidural Steroid Injection    Lumbar spondylosis M47.816    Relevant Medications    gabapentin (Neurontin) 300 mg capsule    Other Relevant Orders    Epidural Steroid Injection

## 2024-05-29 NOTE — PROGRESS NOTES
"   Follow Up Visit:  intralaminar LESI L4-L5  5/9/24    Location of Pain: lumbar neuritis, left facet cyst L4-L5  [place] improvement for 2 weeks after procedure. Last week, states noticed similar pain prior to procedure. Sitting aggravates pain, describes as \"locked up, shooting heavy pain\" Currently pain is not as frequent but seems to occurring every day past week. Pain radiates bilat legs to feet-sharp pain. Walking, moving helps. Does not interfere with sleep.      Pain Score: 1/10    Other pain medication/neuromodulator: duloxetine, gabapentin-will need refill    Therapeutic Goals:    Therapeutic Assessment:    Injections and/or Procedures: 5/9/24 LESI, L4-5 facet cyst injection    Other: PT few weeks prior to procedure-did not help  OA 1/31/24  Oswestry Score 18      Subjective   Patient ID: Joaquim Batres is a 58 y.o. male who presents for lumbar radicular pain  HPI    58-year-old male presents for follow-up of lumbar radicular pain.  Medical history pertinent for hypertension and depression.  Patient noticed lumbar radicular symptoms approximately 10 to 12 months ago without any inciting event.  Lumbar MRI consistent with significant wear and tear at L3-4 and L4-5; severe narrowing with degenerative changes at L4-5; patient also has a left facet cyst at L4-5.  Failed a formal course of physical therapy.  Previously treating his pain with a large amount of Advil/Tylenol.  Caution to decrease use of this medication.  He also tried a steroid taper that provided limited relief.  Added gabapentin 600 mg 3 times daily and duloxetine 60 mg daily which was beneficial.  After review of MRI he was scheduled for an interlaminar lumbar epidural steroid injection at L4-5 and an L4-5 left facet cyst injection.  He presents at today's office visit for follow-up after his recent injection on 5/9/2024.  Patient states he received significant relief; 90 to 100% relief for approximately 2 to 3 weeks.  He is noting return of " symptoms. Currently experiencing low back pain radiating into bilateral hips and lower extremities.  He has intermittent numbness and tingling to his bilateral lower extremities.  States that his legs may feel heavy after sitting for a long period of time.  He has most pain when he goes from seated to standing position and with bending.  He has noticed weakness in his lower extremities.  He denies any bowel or bladder changes.  His pain can be aching, sharp and shooting.  Managing pain with gabapentin 600 mg 3 times daily and duloxetine 60 mg daily.  He does admit to not taking these medications consistently secondary to improvement in pain after his injection.  He has restarted both of these medications and has been taking them consistently for the past 1 week.  He has decreased use of dual action Advil/Tylenol to no more than 6/day.    OARRS:  Trish Cali, APRN-CNP, APRN-CNS on 5/29/2024  4:22 PM  I have personally reviewed the OARRS report for Joaquim Batres. I have considered the risks of abuse, dependence, addiction and diversion    Is the patient prescribed a combination of a benzodiazepine and opioid?  No    Last Urine Drug Screen / ordered today: No  No results found for this or any previous visit (from the past 8760 hour(s)).  N/A    Controlled Substance Agreement:  Date of the Last Agreement:       Monitoring and compliance:    ORT:    PDUQ:    Office Agreement: 1/31/24  Oswestry score 18      Review of Systems    ROS:   General: No fevers, chills, weight loss  Skin: Negative for lesions  Eyes: No acute vision changes  Ears: No vertigo  Nose, mouth, throat: No difficulty swallowing or speaking  Respiratory: No cough, shortness of breath, cyanosis  Cardiovascular: Negative for chest pain syncope or palpitation  Gastrointestinal: No constipation, nausea, vomiting  Neurological: Negative for headache, positive for: Paresthesia and intermittent weakness lower extremities  Psychological: Negative for  severe or debilitating anxiety, depression. Negative memory loss  Musculoskeletal: Positive for arthralgia, myalgia and pain  Endocrine: Negative for weight gain, appetite changes, excessive sweating  Allergy/immune: Negative    All 13 systems were reviewed and are within normal levels except as noted or in the history of present illness.  Positive or pertinent negative responses are noted or were in the history of present illness. As noted, the patient denies significant or impairing weakness in the bilateral upper and lower extremities, medication induced constipation, and bowel or bladder incontinence.     Current Outpatient Medications:     buPROPion XL (Wellbutrin XL) 150 mg 24 hr tablet, Take 1 tablet (150 mg) by mouth once daily., Disp: 90 tablet, Rfl: 3    DULoxetine (Cymbalta) 60 mg DR capsule, Take 1 capsule (60 mg) by mouth once daily. Do not crush or chew., Disp: 30 capsule, Rfl: 2    ibuprofen-acetaminophen (AdviL Dual Action) 125-250 mg tablet per tablet, Take by mouth every 8 hours., Disp: , Rfl:     lisinopril 10 mg tablet, Take 1 tablet (10 mg) by mouth once daily. for blood pressure, Disp: 90 tablet, Rfl: 3    DULoxetine (Cymbalta) 60 mg DR capsule, Take 1 capsule (60 mg) by mouth once daily. Do not crush or chew. Do not start before April 4, 2024., Disp: 30 capsule, Rfl: 2    gabapentin (Neurontin) 300 mg capsule, Take 2 capsules (600mg) three times daily, Disp: 180 capsule, Rfl: 2     Past Medical History:   Diagnosis Date    Other forms of angina pectoris (CMS-[facility]) 09/03/2014    Angina at rest    Unspecified injury of right wrist, hand and finger(s), sequela 02/17/2018    Injury of right index finger, sequela        History reviewed. No pertinent surgical history.     Family History   Problem Relation Name Age of Onset    No Known Problems Mother      No Known Problems Father          No Known Allergies     Objective     Visit Vitals  /75   Pulse 56   Resp 18   Ht 1.829 m (6')   Wt 87.5 kg  (192 lb 12.8 oz)   SpO2 97%   BMI 26.15 kg/m²   Smoking Status Former   BSA 2.11 m²        Physical Exam    PE:  General: Well-developed, well-nourished, no acute distress. The patient demonstrates no pain behavior, symptom magnification or overt drug-seeking behavior.  Eye: Pupils appropriate for room lighting  Neck/thyroid: No obvious goiter or enlargement of neck noted  Respiratory exam: Normal respiratory effort, unlabored respiration. No accessory muscle use noted  Cardiac exam: Bilateral radial pulses intact  Abdominal: Nondistended  Spine, lumbar: The patient is able to rise from a seated to standing position without hesitancy, push off, or delay. Gait is grossly nonantalgic.  Forward leaning posture.  Flexion intact with extension limited and increasing pain.  Tenderness to paraspinous musculature lower lumbar spine.  Positive straight leg raise bilaterally.  Neurologic exam: Muscle strength is antigravity in all 4 extremities.  Equal muscle strength bilateral lower extremities 5/5.  Normal sensation bilateral lower extremities.  Psychiatric exam: Judgment and insight normal, affect normal, speech is fluent, affect appropriate, demonstrating no signs of hypersomnolence, sedation, or confusion          Assessment/Plan   Problem List Items Addressed This Visit             ICD-10-CM    Lumbar neuritis M54.16     58-year-old male presents for follow-up of lower back pain radiating to bilateral lower extremities.  MRI consistent with degenerative changes L3-4 and L4-5 with severe narrowing at L4-5; left L4-5 facet cyst.  Patient proceeded with interlaminar lumbar epidural steroid injection at L4-5 and L4-5 left facet cyst injection on 5/9/2024.  He states he received significant relief; 90 to 100% relief lasting 2 to 3 weeks.  He is noting return of symptoms with increasing radicular symptoms in bilateral lower extremities.  Reviewed imaging and patient presentation with Dr. Huang suggesting patient may  benefit from a more targeted injection such as a transforaminal epidural steroid injection to provide longer lasting relief.  Reviewed risks and benefits and the patient would like to proceed.  Patient felt he received significant relief with his injection, however, he would like more sustained relief.  Pain interferes with his daily activities and makes it difficult for him to work.  Symptoms and exam remain consistent with a lumbar neuritis.  He again exhibits a positive shopping cart sign and straight leg raise bilaterally.  Tolerating gabapentin 600 mg 3 times daily and duloxetine 60 mg daily.  Advised patient to not interrupt these medications and take consistently for optimal pain relief.  Patient may also continue supplementing with dual action Advil/Tylenol limiting his dose to no more than 6/day.  Plan reviewed with patient at today's visit.    -Patient scheduled for bilateral L4 transforaminal lumbar epidural steroid injection under fluoroscopic guidance.  Reviewed risks and benefits and patient would like to proceed with this injection.  -Continue gabapentin 600 mg 3 times daily.  Advised patient to take on a consistent basis for optimal results.  The patient was counseled on the risks and potential side effects of gabapentin as well as its importance for dosage titration. Side effects included but were not limited to, drowsiness, sedation, cognitive decline, peripheral edema, weight gain, seizures, with abrupt withdrawal.  Patient was instructed to call the office with any concerns or side effects before abruptly stopping medication.   -Continue duloxetine 60 mg daily.  Advised patient to take consistently for optimal pain relief.  -Patient will continue to supplement with dual action Advil/Tylenol.  Recommended patient limit use of both NSAIDs and Tylenol.  Patient states he limits use of dual action Advil/Tylenol to no more than 6/day.  The patient was cautioned about possible side effects and risks of  this medication including stomach upset, stomach ulcers, kidney risks and cardiovascular risks to include hypertension and slightly increased risks of stroke and myocardial infarction. Also discussed were ways to minimize these risks by taking this medication with food, staying well-hydrated, watching for any hypertension, and taking the medication with a stomach protectant such as pepcid, zantac, or a proton pump inhibitor.   -Follow-up 4 weeks after his injection for reevaluation.    Disclaimer: This note was transcribed using an audio transcription device.  As such, minor errors may be present with regard to spelling, punctuation, and inadvertent word insertion.  Please disregard such errors.           Relevant Medications    gabapentin (Neurontin) 300 mg capsule    Other Relevant Orders    Epidural Steroid Injection    Lumbar spondylosis M47.816    Relevant Medications    gabapentin (Neurontin) 300 mg capsule    Other Relevant Orders    Epidural Steroid Injection

## 2024-06-14 ENCOUNTER — HOSPITAL ENCOUNTER (OUTPATIENT)
Dept: GASTROENTEROLOGY | Facility: HOSPITAL | Age: 59
Discharge: HOME | End: 2024-06-14
Payer: COMMERCIAL

## 2024-06-14 ENCOUNTER — HOSPITAL ENCOUNTER (OUTPATIENT)
Dept: RADIOLOGY | Facility: HOSPITAL | Age: 59
Discharge: HOME | End: 2024-06-14
Payer: COMMERCIAL

## 2024-06-14 VITALS
SYSTOLIC BLOOD PRESSURE: 127 MMHG | RESPIRATION RATE: 16 BRPM | DIASTOLIC BLOOD PRESSURE: 75 MMHG | HEART RATE: 51 BPM | OXYGEN SATURATION: 97 % | TEMPERATURE: 97 F

## 2024-06-14 DIAGNOSIS — M54.16 LUMBAR NEURITIS: ICD-10-CM

## 2024-06-14 DIAGNOSIS — R52 PAIN: ICD-10-CM

## 2024-06-14 DIAGNOSIS — M47.816 LUMBAR SPONDYLOSIS: ICD-10-CM

## 2024-06-14 PROCEDURE — 2500000004 HC RX 250 GENERAL PHARMACY W/ HCPCS (ALT 636 FOR OP/ED): Performed by: PHYSICAL MEDICINE & REHABILITATION

## 2024-06-14 PROCEDURE — 2500000005 HC RX 250 GENERAL PHARMACY W/O HCPCS: Performed by: PHYSICAL MEDICINE & REHABILITATION

## 2024-06-14 PROCEDURE — 2550000001 HC RX 255 CONTRASTS: Performed by: PHYSICAL MEDICINE & REHABILITATION

## 2024-06-14 RX ORDER — DEXAMETHASONE SODIUM PHOSPHATE 10 MG/ML
INJECTION INTRAMUSCULAR; INTRAVENOUS AS NEEDED
Status: COMPLETED | OUTPATIENT
Start: 2024-06-14 | End: 2024-06-14

## 2024-06-14 RX ORDER — LIDOCAINE HYDROCHLORIDE 5 MG/ML
INJECTION, SOLUTION INFILTRATION; INTRAVENOUS AS NEEDED
Status: COMPLETED | OUTPATIENT
Start: 2024-06-14 | End: 2024-06-14

## 2024-06-14 RX ORDER — METHYLPREDNISOLONE ACETATE 40 MG/ML
INJECTION, SUSPENSION INTRA-ARTICULAR; INTRALESIONAL; INTRAMUSCULAR; SOFT TISSUE AS NEEDED
Status: COMPLETED | OUTPATIENT
Start: 2024-06-14 | End: 2024-06-14

## 2024-06-14 ASSESSMENT — PAIN - FUNCTIONAL ASSESSMENT
PAIN_FUNCTIONAL_ASSESSMENT: 0-10
PAIN_FUNCTIONAL_ASSESSMENT: 0-10

## 2024-06-14 ASSESSMENT — PAIN SCALES - GENERAL
PAINLEVEL_OUTOF10: 3
PAINLEVEL_OUTOF10: 4

## 2024-06-14 ASSESSMENT — ENCOUNTER SYMPTOMS
OCCASIONAL FEELINGS OF UNSTEADINESS: 0
LOSS OF SENSATION IN FEET: 0
DEPRESSION: 0

## 2024-06-14 ASSESSMENT — COLUMBIA-SUICIDE SEVERITY RATING SCALE - C-SSRS
1. IN THE PAST MONTH, HAVE YOU WISHED YOU WERE DEAD OR WISHED YOU COULD GO TO SLEEP AND NOT WAKE UP?: NO
2. HAVE YOU ACTUALLY HAD ANY THOUGHTS OF KILLING YOURSELF?: NO
6. HAVE YOU EVER DONE ANYTHING, STARTED TO DO ANYTHING, OR PREPARED TO DO ANYTHING TO END YOUR LIFE?: NO

## 2024-06-14 NOTE — DISCHARGE INSTRUCTIONS
You had a pain management procedure today.    Observe/ monitor for the following signs & symptoms:  If you notice Excessive bleeding (slow general oozing that completely soaks the dressing, or fresh bright red bleeding).   In either case, apply pressure to the area, elevate it if possible & call your doctor at once.    Also observe for:  Change in color  Numbness/tingling  Coldness to the touch  Swelling  Drainage  Temperature of 101.5 or higher.  Increased, uncontrollable pain.    *If you notice the above signs & symptoms, please call your doctor right away!*      Discharge Instructions:    Your pain may not be gone immediately after this procedure; it generally takes 3 to 5 days for the steroid to work.   Keep the needle site clean & dry for 24 hours.  Continue your present medications.  Make an appointment to see your doctor in 2-3 weeks.  If any problems occur, or if you have any further questions, please call as soon as possible. If you find that you cannot reach your doctor, but feel that the condition nees a doctor's attention, go to the closest emergency department & take this discharge paper with you.       Dr. Huang's Office: [phone]

## 2024-06-23 ENCOUNTER — DOCUMENTATION (OUTPATIENT)
Dept: PHYSICAL THERAPY | Facility: CLINIC | Age: 59
End: 2024-06-23
Payer: COMMERCIAL

## 2024-06-23 NOTE — PROGRESS NOTES
Physical Therapy    Discharge Summary    Name: Joaquim Batres  MRN: [id number]  : 1965  Date: 24    Discharge Summary: PT    Discharge Information: Date of discharge 24, Date of last visit 3/5/24, Date of evaluation 24, Number of attended visits 4, Referred by Sal SUE, and Referred for lumbar neuritis; lumbar spondylosis    Therapy Summary: The patient was seen for 4 visits and then was a no show to his last scheduled appointment.  We have not heard from the patient since.  Thank you for your referral.      Discharge Status: unknown     Rehab Discharge Reason: Failed to schedule and/or keep follow-up appointment(s)

## 2024-07-10 ENCOUNTER — OFFICE VISIT (OUTPATIENT)
Dept: PAIN MEDICINE | Facility: HOSPITAL | Age: 59
End: 2024-07-10
Payer: COMMERCIAL

## 2024-07-10 VITALS
HEART RATE: 60 BPM | BODY MASS INDEX: 26.01 KG/M2 | HEIGHT: 72 IN | RESPIRATION RATE: 16 BRPM | WEIGHT: 192 LBS | OXYGEN SATURATION: 98 % | SYSTOLIC BLOOD PRESSURE: 106 MMHG | DIASTOLIC BLOOD PRESSURE: 69 MMHG

## 2024-07-10 DIAGNOSIS — M54.16 LUMBAR NEURITIS: ICD-10-CM

## 2024-07-10 DIAGNOSIS — M47.816 LUMBAR SPONDYLOSIS: ICD-10-CM

## 2024-07-10 DIAGNOSIS — M25.562 CHRONIC PAIN OF LEFT KNEE: Primary | ICD-10-CM

## 2024-07-10 DIAGNOSIS — G89.29 CHRONIC PAIN OF LEFT KNEE: Primary | ICD-10-CM

## 2024-07-10 PROCEDURE — 99214 OFFICE O/P EST MOD 30 MIN: CPT | Performed by: CLINICAL NURSE SPECIALIST

## 2024-07-10 PROCEDURE — 3074F SYST BP LT 130 MM HG: CPT | Performed by: CLINICAL NURSE SPECIALIST

## 2024-07-10 PROCEDURE — 3078F DIAST BP <80 MM HG: CPT | Performed by: CLINICAL NURSE SPECIALIST

## 2024-07-10 RX ORDER — DULOXETIN HYDROCHLORIDE 30 MG/1
30 CAPSULE, DELAYED RELEASE ORAL DAILY
Qty: 30 CAPSULE | Refills: 2 | Status: SHIPPED | OUTPATIENT
Start: 2024-07-10 | End: 2024-08-09

## 2024-07-10 ASSESSMENT — COLUMBIA-SUICIDE SEVERITY RATING SCALE - C-SSRS
2. HAVE YOU ACTUALLY HAD ANY THOUGHTS OF KILLING YOURSELF?: NO
6. HAVE YOU EVER DONE ANYTHING, STARTED TO DO ANYTHING, OR PREPARED TO DO ANYTHING TO END YOUR LIFE?: NO
1. IN THE PAST MONTH, HAVE YOU WISHED YOU WERE DEAD OR WISHED YOU COULD GO TO SLEEP AND NOT WAKE UP?: NO

## 2024-07-10 ASSESSMENT — ENCOUNTER SYMPTOMS
OCCASIONAL FEELINGS OF UNSTEADINESS: 0
DEPRESSION: 0
LOSS OF SENSATION IN FEET: 0

## 2024-07-10 NOTE — ASSESSMENT & PLAN NOTE
58-year-old male presents for follow-up of lower back pain radiating to bilateral lower extremities.  MRI consistent with degenerative changes L3-4 and L4-5 with severe narrowing at L4-5; left L4-5 facet cyst.  Patient proceeded with interlaminar lumbar epidural steroid injection at L4-5 and L4-5 left facet cyst injection on 5/9/2024.  He states he received significant relief; 90 to 100% relief lasting 2 to 3 weeks.  He is noted return of symptoms with increasing radicular symptoms in bilateral lower extremities.  Reviewed imaging and patient presentation with Dr. Huang suggesting patient may benefit from a more targeted injection such as a transforaminal epidural steroid injection to provide longer lasting relief.  Patient presents at today's office visit to follow-up after his recent transforaminal epidural steroid injection at L4.  Currently receiving greater than 90% relief from this most recent injection.  He states that he has no radicular symptoms at this time.  He is able to stand longer and walk further with less discomfort.  He has been able to resume most of his normal activity.  Advised patient that he may repeat his bilateral L4 transforaminal epidural steroid injection every 3 to 4 months as needed.  He feels that he is receiving significant relief and does not need to repeat the injection at this time.  He is most bothered by left knee pain/instability at today's visit.  He feels that his home therapy exercises and stretches may have aggravated his left knee.  At this time I think it would be reasonable for the patient to be evaluated by orthopedics for left knee pain.  He is managing his pain with gabapentin 600 mg 3 times daily and duloxetine 60 mg daily.  He has been experiencing some side effects with duloxetine such as intermittent zapping feeling and vivid dreams.  We discussed the benefits of duloxetine for neuropathic/radicular symptoms.  Advised that he may try to decrease his dose to 30  mg daily and monitor for improvement and side effects.  Plan reviewed with patient at today's visit.    -Provided a referral to orthopedics for evaluation of left knee pain and instability.   -Advised patient that he may repeat his bilateral L4 transforaminal lumbar epidural steroid injection every 3 to 4 months as needed.  He currently is endorsing greater than 90% relief from this most recent injection.  -Continue gabapentin 600 mg 3 times daily.  Advised patient to take on a consistent basis for optimal results.  The patient was counseled on the risks and potential side effects of gabapentin as well as its importance for dosage titration. Side effects included but were not limited to, drowsiness, sedation, cognitive decline, peripheral edema, weight gain, seizures, with abrupt withdrawal.  Patient was instructed to call the office with any concerns or side effects before abruptly stopping medication.   -Due to potential side effects with duloxetine 60 mg daily we will decrease his dose to duloxetine 30 mg daily.  Advised patient to notify our office if he continues to experience side effects with this medication.  Recommended that he not abruptly stop this medication.    -Patient will continue to supplement with dual action Advil/Tylenol.  Recommended patient limit use of both NSAIDs and Tylenol.  Patient states he limits use of dual action Advil/Tylenol to no more than 6/day.  The patient was cautioned about possible side effects and risks of this medication including stomach upset, stomach ulcers, kidney risks and cardiovascular risks to include hypertension and slightly increased risks of stroke and myocardial infarction. Also discussed were ways to minimize these risks by taking this medication with food, staying well-hydrated, watching for any hypertension, and taking the medication with a stomach protectant such as pepcid, zantac, or a proton pump inhibitor.   -Follow-up in 3 months or sooner if needed.       Disclaimer: This note was transcribed using an audio transcription device.  As such, minor errors may be present with regard to spelling, punctuation, and inadvertent word insertion.  Please disregard such errors.

## 2024-07-10 NOTE — PROGRESS NOTES
Subjective   Patient ID: Joaquim Batres is a 58 y.o. male who presents for lumbar radicular pain    HPI  58 year old male presents for follow up of lumbar radicular pain.  History pertinent for hypertension and depression.  Lumbar radicular symptoms approximately 1 year ago without inciting event.  Lumbar MRI consistent with significant wear-and-tear at L3-4 and L4-5; severe narrowing with degenerative changes at L4-5; patient also has a left facet cyst at L4-5.  Failed a formal course of physical therapy.  Previously treated his pain with a large amount of Advil/Tylenol.  Advised patient to reduce usage of both Advil and Tylenol.  He also previously has done a steroid taper which provided limited relief.  We added gabapentin 600 mg 3 times daily and duloxetine 60 mg daily which appears beneficial.  After review of imaging and patient's symptoms he was scheduled for an interlaminar lumbar epidural steroid injection at L4-5 with an L4-5 left facet cyst injection.  He received significant relief with this injection for 2 to 3 weeks with symptoms returning.  At that time the patient was scheduled for a more targeted injection in the form of a bilateral L4 transforaminal epidural steroid injection.  He presents at today's office visit for follow-up after his transforaminal epidural steroid injection.  He currently is endorsing significant relief with this most recent injection.  States he received greater than 90% relief from this injection and continues to receive this amount of relief.  He is able to stand longer and walk further with less discomfort.  He is able to resume much of his normal activity due to less pain.  He continues to have some back pain, however, denies any radicular symptoms at this time in his lower extremities.  He denies any numbness/tingling or weakness.  Denies any bowel or bladder changes.  He has noticed an increase in left knee and instability.  He feels that may be the home therapy  exercises he has been doing has aggravated his knee.  Continues to do well with gabapentin 600 mg 3 times daily.  He has noted more vivid dreams with duloxetine and is asking about weaning off this medication.    Location of Pain: pt is here for injection follow up.      Pain Score: 1/10    Other pain medication/neuromodulator: duloxetine-possibly come off of this one, gabapentin-needs refill    Injections and/or Procedures: 6/14/24 Bilateral Transforaminal CLIF L4-90% relief and does not want to repeat yet    Other: PT previously   OA 1/31/24  Oswestry Score 18    OARRS:  Trish WALSH Karely, APRN-CNP, APRN-CNS on 7/10/2024  3:58 PM  I have personally reviewed the OARRS report for Joaquim Batres. I have considered the risks of abuse, dependence, addiction and diversion    Is the patient prescribed a combination of a benzodiazepine and opioid?  No    Last Urine Drug Screen / ordered today: No  No results found for this or any previous visit (from the past 8760 hour(s)).  N/A    Controlled Substance Agreement:  Date of the Last Agreement:       Monitoring and compliance:    ORT:    PDUQ:    Office Agreement:      Review of Systems    ROS:   General: No fevers, chills, weight loss  Skin: Negative for lesions  Eyes: No acute vision changes  Ears: No vertigo  Nose, mouth, throat: No difficulty swallowing or speaking  Respiratory: No cough, shortness of breath, cyanosis  Cardiovascular: Negative for chest pain syncope or palpitation  Gastrointestinal: No constipation, nausea, vomiting  Neurological: Negative for headache,   Psychological: Negative for severe or debilitating anxiety, depression. Negative memory loss  Musculoskeletal: Positive for arthralgia, myalgia and pain  Endocrine: Negative for weight gain, appetite changes, excessive sweating  Allergy/immune: Negative    All 13 systems were reviewed and are within normal levels except as noted or in the history of present illness.  Positive or pertinent negative  responses are noted or were in the history of present illness. As noted, the patient denies significant or impairing weakness in the bilateral upper and lower extremities, medication induced constipation, and bowel or bladder incontinence.     Current Outpatient Medications:     buPROPion XL (Wellbutrin XL) 150 mg 24 hr tablet, Take 1 tablet (150 mg) by mouth once daily., Disp: 90 tablet, Rfl: 3    gabapentin (Neurontin) 300 mg capsule, Take 2 capsules (600mg) three times daily, Disp: 180 capsule, Rfl: 2    ibuprofen-acetaminophen (AdviL Dual Action) 125-250 mg tablet per tablet, Take by mouth every 8 hours., Disp: , Rfl:     lisinopril 10 mg tablet, Take 1 tablet (10 mg) by mouth once daily. for blood pressure, Disp: 90 tablet, Rfl: 3    DULoxetine (Cymbalta) 30 mg DR capsule, Take 1 capsule (30 mg) by mouth once daily. Do not crush or chew., Disp: 30 capsule, Rfl: 2     Past Medical History:   Diagnosis Date    Other forms of angina pectoris (CMS-[facility]) 09/03/2014    Angina at rest    Unspecified injury of right wrist, hand and finger(s), sequela 02/17/2018    Injury of right index finger, sequela        No past surgical history on file.     Family History   Problem Relation Name Age of Onset    No Known Problems Mother      No Known Problems Father          No Known Allergies     Objective     Visit Vitals  /69   Pulse 60   Resp 16   Ht 1.829 m (6')   Wt 87.1 kg (192 lb)   SpO2 98%   BMI 26.04 kg/m²   Smoking Status Former   BSA 2.1 m²        Physical Exam    PE:  General: Well-developed, well-nourished, no acute distress. The patient demonstrates no pain behavior, symptom magnification or overt drug-seeking behavior.  Eye: Pupils appropriate for room lighting  Neck/thyroid: No obvious goiter or enlargement of neck noted  Respiratory exam: Normal respiratory effort, unlabored respiration. No accessory muscle use noted  Cardiac exam: Bilateral radial pulses intact  Abdominal: Nondistended  Spine, lumbar: The  patient is able to rise from a seated to standing position without hesitancy, push off, or delay. Gait is grossly nonantalgic. Tenderness to paraspinous musculature is noted lower lumbar spine.  Flexion intact with extension increasing pain to low back.  Negative straight leg raise.  Ortho: Left knee: Tenderness over lateral joint line left knee.  Pain with active/passive range of motion.  Positive crepitus negative effusion.  Neurologic exam: Muscle strength is antigravity in all 4 extremities.  Equal muscle strength bilateral lower extremities 5/5.  Psychiatric exam: Judgment and insight normal, affect normal, speech is fluent, affect appropriate, demonstrating no signs of hypersomnolence, sedation, or confusion        Assessment/Plan   Problem List Items Addressed This Visit             ICD-10-CM    Lumbar neuritis M54.16     58-year-old male presents for follow-up of lower back pain radiating to bilateral lower extremities.  MRI consistent with degenerative changes L3-4 and L4-5 with severe narrowing at L4-5; left L4-5 facet cyst.  Patient proceeded with interlaminar lumbar epidural steroid injection at L4-5 and L4-5 left facet cyst injection on 5/9/2024.  He states he received significant relief; 90 to 100% relief lasting 2 to 3 weeks.  He is noted return of symptoms with increasing radicular symptoms in bilateral lower extremities.  Reviewed imaging and patient presentation with Dr. Huang suggesting patient may benefit from a more targeted injection such as a transforaminal epidural steroid injection to provide longer lasting relief.  Patient presents at today's office visit to follow-up after his recent transforaminal epidural steroid injection at L4.  Currently receiving greater than 90% relief from this most recent injection.  He states that he has no radicular symptoms at this time.  He is able to stand longer and walk further with less discomfort.  He has been able to resume most of his normal  activity.  Advised patient that he may repeat his bilateral L4 transforaminal epidural steroid injection every 3 to 4 months as needed.  He feels that he is receiving significant relief and does not need to repeat the injection at this time.  He is most bothered by left knee pain/instability at today's visit.  He feels that his home therapy exercises and stretches may have aggravated his left knee.  At this time I think it would be reasonable for the patient to be evaluated by orthopedics for left knee pain.  He is managing his pain with gabapentin 600 mg 3 times daily and duloxetine 60 mg daily.  He has been experiencing some side effects with duloxetine such as intermittent zapping feeling and vivid dreams.  We discussed the benefits of duloxetine for neuropathic/radicular symptoms.  Advised that he may try to decrease his dose to 30 mg daily and monitor for improvement and side effects.  Plan reviewed with patient at today's visit.    -Provided a referral to orthopedics for evaluation of left knee pain and instability.   -Advised patient that he may repeat his bilateral L4 transforaminal lumbar epidural steroid injection every 3 to 4 months as needed.  He currently is endorsing greater than 90% relief from this most recent injection.  -Continue gabapentin 600 mg 3 times daily.  Advised patient to take on a consistent basis for optimal results.  The patient was counseled on the risks and potential side effects of gabapentin as well as its importance for dosage titration. Side effects included but were not limited to, drowsiness, sedation, cognitive decline, peripheral edema, weight gain, seizures, with abrupt withdrawal.  Patient was instructed to call the office with any concerns or side effects before abruptly stopping medication.   -Due to potential side effects with duloxetine 60 mg daily we will decrease his dose to duloxetine 30 mg daily.  Advised patient to notify our office if he continues to experience  side effects with this medication.  Recommended that he not abruptly stop this medication.    -Patient will continue to supplement with dual action Advil/Tylenol.  Recommended patient limit use of both NSAIDs and Tylenol.  Patient states he limits use of dual action Advil/Tylenol to no more than 6/day.  The patient was cautioned about possible side effects and risks of this medication including stomach upset, stomach ulcers, kidney risks and cardiovascular risks to include hypertension and slightly increased risks of stroke and myocardial infarction. Also discussed were ways to minimize these risks by taking this medication with food, staying well-hydrated, watching for any hypertension, and taking the medication with a stomach protectant such as pepcid, zantac, or a proton pump inhibitor.   -Follow-up in 3 months or sooner if needed.      Disclaimer: This note was transcribed using an audio transcription device.  As such, minor errors may be present with regard to spelling, punctuation, and inadvertent word insertion.  Please disregard such errors.           Relevant Medications    DULoxetine (Cymbalta) 30 mg DR capsule    Lumbar spondylosis M47.816     Other Visit Diagnoses         Codes    Chronic pain of left knee    -  Primary M25.562, G89.29    Relevant Orders    Referral to Orthopaedic Surgery

## 2024-07-17 ENCOUNTER — APPOINTMENT (OUTPATIENT)
Dept: ORTHOPEDIC SURGERY | Facility: CLINIC | Age: 59
End: 2024-07-17
Payer: COMMERCIAL

## 2024-07-17 ENCOUNTER — HOSPITAL ENCOUNTER (OUTPATIENT)
Dept: RADIOLOGY | Facility: CLINIC | Age: 59
Discharge: HOME | End: 2024-07-17
Payer: COMMERCIAL

## 2024-07-17 VITALS — HEIGHT: 72 IN | BODY MASS INDEX: 25.73 KG/M2 | WEIGHT: 190 LBS

## 2024-07-17 DIAGNOSIS — M76.892 HAMSTRING TENDINITIS OF LEFT THIGH: Primary | ICD-10-CM

## 2024-07-17 DIAGNOSIS — M25.562 LEFT KNEE PAIN, UNSPECIFIED CHRONICITY: ICD-10-CM

## 2024-07-17 PROCEDURE — 99204 OFFICE O/P NEW MOD 45 MIN: CPT | Performed by: STUDENT IN AN ORGANIZED HEALTH CARE EDUCATION/TRAINING PROGRAM

## 2024-07-17 PROCEDURE — 73564 X-RAY EXAM KNEE 4 OR MORE: CPT | Mod: LT

## 2024-07-17 PROCEDURE — 3008F BODY MASS INDEX DOCD: CPT | Performed by: STUDENT IN AN ORGANIZED HEALTH CARE EDUCATION/TRAINING PROGRAM

## 2024-07-17 ASSESSMENT — PAIN SCALES - GENERAL: PAINLEVEL_OUTOF10: 3

## 2024-07-17 ASSESSMENT — PAIN DESCRIPTION - DESCRIPTORS: DESCRIPTORS: SHARP

## 2024-07-17 ASSESSMENT — PAIN - FUNCTIONAL ASSESSMENT: PAIN_FUNCTIONAL_ASSESSMENT: 0-10

## 2024-07-17 NOTE — PROGRESS NOTES
PRIMARY CARE PHYSICIAN: SAUNDRA Garsia  REFERRING PROVIDER: No referring provider defined for this encounter.     CONSULT ORTHOPAEDIC: Knee Evaluation    ASSESSMENT & PLAN    Impression: 58 y.o. male with left knee distal hamstring strain of biceps femoris.    Plan:   I explained to the patient the nature of their diagnosis.  I reviewed their imaging studies with them.    Based on the history, physical exam and imaging studies above, the patient's presentation is consistent with the above diagnosis.  I had a long discussion with the patient regarding their presentation and the treatment options.  We discussed initial nonoperative versus operative management options as well as potential further diagnostic imaging.  I reviewed the patient's imaging studies with him.  We discussed his treatment options going forward.  I recommend nonoperative management for his distal hamstring strain of the biceps femoris.  I provided him with a prescription for meloxicam as well as Voltaren gel as a topical anti-inflammatory.  He will work on gentle stretching and progressive strengthening.  He will avoid aggravating motions and positions.    Follow-Up: Patient will follow-up as needed    At the end of the visit, all questions were answered in full. The patient is in agreement with the plan and recommendations. They will call the office with any questions/concerns.    Note dictated with [employer] software. Completed without full typed error editing and sent to avoid delay.       SUBJECTIVE  CHIEF COMPLAINT:   Chief Complaint   Patient presents with    Left Knee - Pain        HPI: Joaquim Batres is a 58 y.o. patient who presents today with left knee pain.  The patient was in rehab at home for his back and felt a pull in the knee 2 months ago. Pain shoots up and down the leg. XR done today.  He localizes the pain to the lateral aspect of the knee just proximal to the lateral joint line along the biceps  femoris distal hamstring tendon.  He denies any significant swelling or mechanical symptoms in the knee.  No sensations of instability.    They deny any constant or progressive numbness or tingling in their legs.     REVIEW OF SYSTEMS  Constitutional: See HPI for pain assessment, No significant weight loss, recent trauma  Cardiovascular: No chest pain, shortness of breath  Respiratory: No difficulty breathing, cough  Gastrointestinal: No nausea, vomiting, diarrhea, constipation  Musculoskeletal: Noted in HPI, positive for pain, restricted motion, stiffness  Integumentary: No rashes, easy bruising, redness   Neurological: no numbness or tingling in extremities, no gait disturbances   Psychiatric: No mood changes, memory changes, social issues  Heme/Lymph: no excessive swelling, easy bruising, excessive bleeding  ENT: No hearing changes  Eyes: No vision changes    Past Medical History:   Diagnosis Date    Other forms of angina pectoris (CMS-[facility]) 09/03/2014    Angina at rest    Unspecified injury of right wrist, hand and finger(s), sequela 02/17/2018    Injury of right index finger, sequela        No Known Allergies     No past surgical history on file.     Family History   Problem Relation Name Age of Onset    No Known Problems Mother      No Known Problems Father          Social History     Socioeconomic History    Marital status:      Spouse name: Not on file    Number of children: Not on file    Years of education: Not on file    Highest education level: Not on file   Occupational History    Not on file   Tobacco Use    Smoking status: Former     Types: Cigarettes    Smokeless tobacco: Not on file   Vaping Use    Vaping status: Never Used   Substance and Sexual Activity    Alcohol use: Yes     Comment: OCCASIONAL    Drug use: Never    Sexual activity: Not on file   Other Topics Concern    Not on file   Social History Narrative    Not on file     Social Determinants of Health     Financial Resource Strain:  Not on file   Food Insecurity: Not on file   Transportation Needs: Not on file   Physical Activity: Not on file   Stress: Not on file   Social Connections: Not on file   Intimate Partner Violence: Not on file   Housing Stability: Not on file        CURRENT MEDICATIONS:   Current Outpatient Medications   Medication Sig Dispense Refill    buPROPion XL (Wellbutrin XL) 150 mg 24 hr tablet Take 1 tablet (150 mg) by mouth once daily. 90 tablet 3    DULoxetine (Cymbalta) 30 mg DR capsule Take 1 capsule (30 mg) by mouth once daily. Do not crush or chew. 30 capsule 2    gabapentin (Neurontin) 300 mg capsule Take 2 capsules (600mg) three times daily 180 capsule 2    ibuprofen-acetaminophen (AdviL Dual Action) 125-250 mg tablet per tablet Take by mouth every 8 hours.      lisinopril 10 mg tablet Take 1 tablet (10 mg) by mouth once daily. for blood pressure 90 tablet 3     No current facility-administered medications for this visit.        OBJECTIVE    PHYSICAL EXAM      5/9/2024    10:35 AM 5/9/2024    10:43 AM 5/29/2024     3:33 PM 6/14/2024    10:42 AM 6/14/2024    11:31 AM 6/14/2024    11:42 AM 7/10/2024     3:08 PM   Vitals   Systolic 113 125 126 107 120 127 106   Diastolic 66 75 75 77 63 75 69   Heart Rate 56 58 56 62 63 51 60   Temp    36.1 °C (97 °F)      Resp 16 14 18 14 18 16 16   Height (in)   1.829 m (6')    1.829 m (6')   Weight (lb)   192.8    192   BMI   26.15 kg/m2    26.04 kg/m2   BSA (m2)   2.11 m2    2.1 m2   Visit Report   Report    Report      There is no height or weight on file to calculate BMI.    GENERAL: A/Ox3, NAD. Appears healthy, well nourished  PSYCHIATRIC: Mood stable, appropriate memory recall  EYES: EOM intact, no scleral icterus  CARDIOVASCULAR: Palpable peripheral pulses  LUNGS: Breathing non-labored on room air  SKIN: no erythema, rashes, or ecchymoses     MUSCULOSKELETAL:  Laterality: left Knee Exam  - Skin intact  - No erythema or warmth  - No ecchymosis or soft tissue swelling  -  Alignment: neutral  - Palpation: Positive tenderness along the biceps femoris distal hamstring tendon, no tenderness along the medial or lateral joint lines  - ROM: 0 - 0 - 130  - Effusion: None  - Strength: knee extension and flexion 5/5, EHL/PF/DF motor intact  - Stability:        Anterior drawer stable       Posterior drawer stable       Varus/valgus stable       negative Lachman  - negative Ebenezer's  - Gait: Trace antalgic  - Hip Exam: flexion to 100+ degrees, full extension, internal/external rotation adequate, and no pain with log roll  - Special Tests: Positive hamstring tightness    NEUROVASCULAR:  - Neurovascular Status: sensation intact to light touch distally, lower extremity motor intact  - Capillary refill brisk at extremities, Bilateral dorsalis pedis pulse 2+    Imaging: Multiple views of the affected left knee(s) demonstrate: No significant osseous normality, no fracture, no significant degenerative change.   X-rays were personally reviewed and interpreted by me.  Radiology reports were reviewed by me as well, if readily available at the time.      Nicholas Gonzalez MD  Attending Surgeon    Sports Medicine Orthopaedic Surgery  [facility] [place]  [facility] [employer] [facility] [employer]

## 2024-07-18 RX ORDER — MELOXICAM 15 MG/1
15 TABLET ORAL DAILY
Qty: 30 TABLET | Refills: 1 | Status: SHIPPED | OUTPATIENT
Start: 2024-07-18 | End: 2024-09-16

## 2024-09-03 DIAGNOSIS — M76.892 HAMSTRING TENDINITIS OF LEFT THIGH: ICD-10-CM

## 2024-09-03 DIAGNOSIS — M54.16 LUMBAR NEURITIS: ICD-10-CM

## 2024-09-03 DIAGNOSIS — M47.816 LUMBAR SPONDYLOSIS: ICD-10-CM

## 2024-09-03 RX ORDER — GABAPENTIN 300 MG/1
CAPSULE ORAL
Qty: 180 CAPSULE | Refills: 2 | OUTPATIENT
Start: 2024-09-03

## 2024-09-03 RX ORDER — MELOXICAM 15 MG/1
15 TABLET ORAL DAILY
Qty: 30 TABLET | Refills: 1 | Status: SHIPPED | OUTPATIENT
Start: 2024-09-03 | End: 2024-11-02

## 2024-10-04 DIAGNOSIS — M54.16 LUMBAR NEURITIS: ICD-10-CM

## 2024-10-04 RX ORDER — DULOXETIN HYDROCHLORIDE 30 MG/1
30 CAPSULE, DELAYED RELEASE ORAL DAILY
Qty: 30 CAPSULE | Refills: 2 | OUTPATIENT
Start: 2024-10-04

## 2024-10-10 ENCOUNTER — APPOINTMENT (OUTPATIENT)
Dept: PAIN MEDICINE | Facility: HOSPITAL | Age: 59
End: 2024-10-10
Payer: COMMERCIAL

## 2024-10-13 DIAGNOSIS — M54.16 LUMBAR NEURITIS: ICD-10-CM

## 2024-10-14 RX ORDER — DULOXETIN HYDROCHLORIDE 30 MG/1
30 CAPSULE, DELAYED RELEASE ORAL DAILY
Qty: 30 CAPSULE | Refills: 2 | OUTPATIENT
Start: 2024-10-14

## 2024-11-02 DIAGNOSIS — M76.892 HAMSTRING TENDINITIS OF LEFT THIGH: ICD-10-CM

## 2024-11-02 RX ORDER — MELOXICAM 15 MG/1
15 TABLET ORAL DAILY
Qty: 30 TABLET | Refills: 1 | Status: SHIPPED | OUTPATIENT
Start: 2024-11-02 | End: 2025-01-01

## 2025-01-04 DIAGNOSIS — I10 PRIMARY HYPERTENSION: ICD-10-CM

## 2025-01-04 DIAGNOSIS — M76.892 HAMSTRING TENDINITIS OF LEFT THIGH: ICD-10-CM

## 2025-01-04 DIAGNOSIS — F32.9 MAJOR DEPRESSIVE DISORDER, SINGLE EPISODE, UNSPECIFIED: ICD-10-CM

## 2025-01-06 RX ORDER — MELOXICAM 15 MG/1
15 TABLET ORAL DAILY
Qty: 30 TABLET | Refills: 1 | Status: SHIPPED | OUTPATIENT
Start: 2025-01-06 | End: 2025-03-07

## 2025-01-08 ENCOUNTER — TELEPHONE (OUTPATIENT)
Dept: PRIMARY CARE | Facility: CLINIC | Age: 60
End: 2025-01-08
Payer: COMMERCIAL

## 2025-01-08 DIAGNOSIS — I10 PRIMARY HYPERTENSION: Primary | ICD-10-CM

## 2025-01-08 DIAGNOSIS — E78.2 MIXED HYPERLIPIDEMIA: ICD-10-CM

## 2025-01-08 DIAGNOSIS — E55.9 VITAMIN D DEFICIENCY: ICD-10-CM

## 2025-01-08 DIAGNOSIS — R73.09 ELEVATED GLUCOSE: ICD-10-CM

## 2025-01-08 DIAGNOSIS — R35.1 NOCTURIA: ICD-10-CM

## 2025-01-08 RX ORDER — BUPROPION HYDROCHLORIDE 150 MG/1
TABLET ORAL
Qty: 90 TABLET | Refills: 0 | Status: SHIPPED | OUTPATIENT
Start: 2025-01-08

## 2025-01-08 RX ORDER — LISINOPRIL 10 MG/1
TABLET ORAL
Qty: 90 TABLET | Refills: 0 | Status: SHIPPED | OUTPATIENT
Start: 2025-01-08

## 2025-01-08 NOTE — TELEPHONE ENCOUNTER
----- Message from Hali Cooper sent at 1/8/2025 12:12 AM EST -----  Regarding: FW: Office Visit  Please let pt know that there is a lab order to be done after 2/1  He should fast 8 hours, hydrate and no supplements but take regular medications.  JOSE MARIA  ----- Message -----  From: Shanell Herrera MA  Sent: 1/7/2025   3:23 PM EST  To: SAUNDRA Garsia  Subject: RE: Office Visit                                 Joaquim is scheduled 2/11 at 2:00.  He asked if you would be putting in lab work to be done prior to coming in. Please advise.  ----- Message -----  From: SAUNDRA Garsia  Sent: 1/6/2025   4:30 PM EST  To: Shanell Herrera MA; Diane Brownlee  Subject: Office Visit                                     I received refill requests.  Pt is due for in office follow up.  It has been a year.  Please bring him in to see me.  THANKS

## 2025-01-18 ENCOUNTER — LAB (OUTPATIENT)
Dept: LAB | Facility: LAB | Age: 60
End: 2025-01-18
Payer: COMMERCIAL

## 2025-01-18 DIAGNOSIS — I10 PRIMARY HYPERTENSION: ICD-10-CM

## 2025-01-18 DIAGNOSIS — E55.9 VITAMIN D DEFICIENCY: ICD-10-CM

## 2025-01-18 DIAGNOSIS — E78.2 MIXED HYPERLIPIDEMIA: ICD-10-CM

## 2025-01-18 DIAGNOSIS — R35.1 NOCTURIA: ICD-10-CM

## 2025-01-18 DIAGNOSIS — R73.09 ELEVATED GLUCOSE: ICD-10-CM

## 2025-01-18 PROCEDURE — 83036 HEMOGLOBIN GLYCOSYLATED A1C: CPT

## 2025-01-18 PROCEDURE — 85027 COMPLETE CBC AUTOMATED: CPT

## 2025-01-18 PROCEDURE — 84443 ASSAY THYROID STIM HORMONE: CPT

## 2025-01-18 PROCEDURE — 80053 COMPREHEN METABOLIC PANEL: CPT

## 2025-01-18 PROCEDURE — 82306 VITAMIN D 25 HYDROXY: CPT

## 2025-01-18 PROCEDURE — 80061 LIPID PANEL: CPT

## 2025-01-18 PROCEDURE — 84153 ASSAY OF PSA TOTAL: CPT

## 2025-01-19 LAB
25(OH)D3 SERPL-MCNC: 64 NG/ML (ref 30–100)
ALBUMIN SERPL BCP-MCNC: 4.5 G/DL (ref 3.4–5)
ALP SERPL-CCNC: 57 U/L (ref 33–120)
ALT SERPL W P-5'-P-CCNC: 20 U/L (ref 10–52)
ANION GAP SERPL CALC-SCNC: 12 MMOL/L (ref 10–20)
AST SERPL W P-5'-P-CCNC: 14 U/L (ref 9–39)
BILIRUB SERPL-MCNC: 0.7 MG/DL (ref 0–1.2)
BUN SERPL-MCNC: 17 MG/DL (ref 6–23)
CALCIUM SERPL-MCNC: 9.6 MG/DL (ref 8.6–10.6)
CHLORIDE SERPL-SCNC: 105 MMOL/L (ref 98–107)
CHOLEST SERPL-MCNC: 208 MG/DL (ref 0–199)
CHOLESTEROL/HDL RATIO: 3.2
CO2 SERPL-SCNC: 26 MMOL/L (ref 21–32)
CREAT SERPL-MCNC: 0.89 MG/DL (ref 0.5–1.3)
EGFRCR SERPLBLD CKD-EPI 2021: >90 ML/MIN/1.73M*2
ERYTHROCYTE [DISTWIDTH] IN BLOOD BY AUTOMATED COUNT: 11.7 % (ref 11.5–14.5)
EST. AVERAGE GLUCOSE BLD GHB EST-MCNC: 105 MG/DL
GLUCOSE SERPL-MCNC: 110 MG/DL (ref 74–99)
HBA1C MFR BLD: 5.3 %
HCT VFR BLD AUTO: 44.7 % (ref 41–52)
HDLC SERPL-MCNC: 64.9 MG/DL
HGB BLD-MCNC: 14.7 G/DL (ref 13.5–17.5)
LDLC SERPL CALC-MCNC: 129 MG/DL
MCH RBC QN AUTO: 30.1 PG (ref 26–34)
MCHC RBC AUTO-ENTMCNC: 32.9 G/DL (ref 32–36)
MCV RBC AUTO: 92 FL (ref 80–100)
NON HDL CHOLESTEROL: 143 MG/DL (ref 0–149)
NRBC BLD-RTO: 0 /100 WBCS (ref 0–0)
PLATELET # BLD AUTO: 232 X10*3/UL (ref 150–450)
POTASSIUM SERPL-SCNC: 4.9 MMOL/L (ref 3.5–5.3)
PROT SERPL-MCNC: 6.8 G/DL (ref 6.4–8.2)
PSA SERPL-MCNC: 0.76 NG/ML
RBC # BLD AUTO: 4.88 X10*6/UL (ref 4.5–5.9)
SODIUM SERPL-SCNC: 138 MMOL/L (ref 136–145)
TRIGL SERPL-MCNC: 71 MG/DL (ref 0–149)
TSH SERPL-ACNC: 2.46 MIU/L (ref 0.44–3.98)
VLDL: 14 MG/DL (ref 0–40)
WBC # BLD AUTO: 4.7 X10*3/UL (ref 4.4–11.3)

## 2025-02-11 ENCOUNTER — APPOINTMENT (OUTPATIENT)
Dept: PRIMARY CARE | Facility: CLINIC | Age: 60
End: 2025-02-11
Payer: COMMERCIAL

## 2025-02-11 VITALS
DIASTOLIC BLOOD PRESSURE: 78 MMHG | SYSTOLIC BLOOD PRESSURE: 120 MMHG | WEIGHT: 199 LBS | BODY MASS INDEX: 26.95 KG/M2 | HEIGHT: 72 IN

## 2025-02-11 DIAGNOSIS — M76.892 HAMSTRING TENDINITIS OF LEFT THIGH: ICD-10-CM

## 2025-02-11 DIAGNOSIS — E78.5 DYSLIPIDEMIA: ICD-10-CM

## 2025-02-11 DIAGNOSIS — F32.9 MAJOR DEPRESSIVE DISORDER, SINGLE EPISODE, UNSPECIFIED: ICD-10-CM

## 2025-02-11 DIAGNOSIS — I10 PRIMARY HYPERTENSION: ICD-10-CM

## 2025-02-11 DIAGNOSIS — N52.8 OTHER MALE ERECTILE DYSFUNCTION: Primary | ICD-10-CM

## 2025-02-11 PROBLEM — F32.A DEPRESSIVE DISORDER: Status: ACTIVE | Noted: 2025-02-11

## 2025-02-11 PROBLEM — H00.019 HORDEOLUM EXTERNUM: Status: ACTIVE | Noted: 2025-02-11

## 2025-02-11 PROBLEM — S68.119A AMPUTATION OF FINGER: Status: ACTIVE | Noted: 2025-02-11

## 2025-02-11 PROBLEM — S69.90XA FINGER INJURY: Status: ACTIVE | Noted: 2025-02-11

## 2025-02-11 PROBLEM — M54.2 NECK PAIN: Status: ACTIVE | Noted: 2025-02-11

## 2025-02-11 PROBLEM — M47.812 SPONDYLOSIS OF CERVICAL SPINE: Status: ACTIVE | Noted: 2025-02-11

## 2025-02-11 PROBLEM — M72.0 DUPUYTREN DISEASE OF PALM: Status: ACTIVE | Noted: 2025-02-11

## 2025-02-11 PROCEDURE — 3078F DIAST BP <80 MM HG: CPT | Performed by: NURSE PRACTITIONER

## 2025-02-11 PROCEDURE — 3074F SYST BP LT 130 MM HG: CPT | Performed by: NURSE PRACTITIONER

## 2025-02-11 PROCEDURE — 3008F BODY MASS INDEX DOCD: CPT | Performed by: NURSE PRACTITIONER

## 2025-02-11 PROCEDURE — 99214 OFFICE O/P EST MOD 30 MIN: CPT | Performed by: NURSE PRACTITIONER

## 2025-02-11 RX ORDER — BUPROPION HYDROCHLORIDE 150 MG/1
150 TABLET ORAL EVERY MORNING
Qty: 30 TABLET | Refills: 11 | Status: SHIPPED | OUTPATIENT
Start: 2025-02-11

## 2025-02-11 RX ORDER — LISINOPRIL 10 MG/1
10 TABLET ORAL DAILY
Qty: 30 TABLET | Refills: 11 | Status: SHIPPED | OUTPATIENT
Start: 2025-02-11

## 2025-02-11 RX ORDER — MELOXICAM 15 MG/1
15 TABLET ORAL DAILY
Qty: 30 TABLET | Refills: 11 | Status: SHIPPED | OUTPATIENT
Start: 2025-02-11 | End: 2026-02-06

## 2025-02-11 NOTE — PATIENT INSTRUCTIONS
Good to see you today.  Stay the course.  Medications refilled.  Labs today.  Follow up based on labs.  Plan on one year follow up.

## 2025-02-11 NOTE — PROGRESS NOTES
Subjective   Patient ID: Joaquim Batres is a 59 y.o. male who presents for Follow-up (Review labs, med refill).    HPI   Work is good- busy.  Physical job, some exercise.   The last round of shots helped with back pain   Sleeping OK    Son gave him a cough.  DDM  30 day   Denies any side effects to medications.   Would like Testosterone checked - guys at work talk about this    Review of Systems   HENT:  Positive for postnasal drip.    Respiratory:  Positive for cough. Negative for shortness of breath.    Cardiovascular:  Negative for chest pain, palpitations and leg swelling.   Endocrine: Negative for polydipsia, polyphagia and polyuria.   Genitourinary:  Negative for dysuria, testicular pain and urgency.        Some ED changes inconsistent   Psychiatric/Behavioral:  Positive for dysphoric mood.        Objective   /78   Ht 1.829 m (6')   Wt 90.3 kg (199 lb)   BMI 26.99 kg/m²     Physical Exam  Vitals and nursing note reviewed.   Constitutional:       Appearance: Normal appearance.   HENT:      Head: Normocephalic and atraumatic.      Right Ear: Tympanic membrane, ear canal and external ear normal.      Left Ear: Tympanic membrane, ear canal and external ear normal.      Nose: Nose normal.      Mouth/Throat:      Comments: PND  Eyes:      Pupils: Pupils are equal, round, and reactive to light.   Neck:      Thyroid: No thyroid mass, thyromegaly or thyroid tenderness.   Cardiovascular:      Rate and Rhythm: Normal rate and regular rhythm.      Pulses: Normal pulses.      Heart sounds: Normal heart sounds.   Pulmonary:      Effort: Pulmonary effort is normal.      Breath sounds: Normal breath sounds.   Abdominal:      General: Bowel sounds are normal.      Palpations: Abdomen is soft.   Skin:     General: Skin is warm.   Neurological:      Mental Status: He is alert and oriented to person, place, and time.   Psychiatric:         Behavior: Behavior normal.         Thought Content: Thought content normal.       Comments: Good eye contact, mood stable.         Assessment/Plan   Assessment & Plan  Primary hypertension  BP to goal on medication    Orders:    lisinopril 10 mg tablet; Take 1 tablet (10 mg) by mouth once daily.    Major depressive disorder, single episode, unspecified    Orders:    buPROPion XL (Wellbutrin XL) 150 mg 24 hr tablet; Take 1 tablet (150 mg) by mouth once daily in the morning. Do not crush, chew, or split.    Hamstring tendinitis of left thigh    Orders:    meloxicam (Mobic) 15 mg tablet; Take 1 tablet (15 mg) by mouth once daily.    Other male erectile dysfunction  Check Testosterone level today    Orders:    Testosterone; Future    Dyslipidemia  Lipids to goal on medication

## 2025-02-12 LAB — TESTOST SERPL-MCNC: 434 NG/DL (ref 250–827)

## 2025-03-23 PROBLEM — N52.8 OTHER MALE ERECTILE DYSFUNCTION: Status: ACTIVE | Noted: 2025-03-23

## 2025-03-23 PROBLEM — F32.A DEPRESSIVE DISORDER: Status: ACTIVE | Noted: 2023-06-14

## 2025-03-23 PROBLEM — H00.019 HORDEOLUM EXTERNUM: Status: RESOLVED | Noted: 2025-02-11 | Resolved: 2025-03-23

## 2025-03-23 PROBLEM — F32.A DEPRESSIVE DISORDER: Status: ACTIVE | Noted: 2023-12-26

## 2025-03-23 PROBLEM — M76.892 HAMSTRING TENDINITIS OF LEFT THIGH: Status: ACTIVE | Noted: 2025-03-23

## 2025-03-23 ASSESSMENT — ENCOUNTER SYMPTOMS
COUGH: 1
SHORTNESS OF BREATH: 0
POLYPHAGIA: 0
POLYDIPSIA: 0
DYSURIA: 0
DYSPHORIC MOOD: 1
PALPITATIONS: 0

## 2025-03-23 NOTE — ASSESSMENT & PLAN NOTE
BP to goal on medication    Orders:    lisinopril 10 mg tablet; Take 1 tablet (10 mg) by mouth once daily.

## 2026-02-12 ENCOUNTER — APPOINTMENT (OUTPATIENT)
Dept: PRIMARY CARE | Facility: CLINIC | Age: 61
End: 2026-02-12
Payer: COMMERCIAL